# Patient Record
Sex: MALE | Race: WHITE | URBAN - METROPOLITAN AREA
[De-identification: names, ages, dates, MRNs, and addresses within clinical notes are randomized per-mention and may not be internally consistent; named-entity substitution may affect disease eponyms.]

---

## 2018-11-09 ENCOUNTER — OFFICE VISIT (OUTPATIENT)
Dept: FAMILY MEDICINE CLINIC | Age: 78
End: 2018-11-09

## 2018-11-09 VITALS
RESPIRATION RATE: 12 BRPM | DIASTOLIC BLOOD PRESSURE: 78 MMHG | SYSTOLIC BLOOD PRESSURE: 145 MMHG | HEART RATE: 63 BPM | OXYGEN SATURATION: 96 % | TEMPERATURE: 98.3 F | WEIGHT: 213 LBS

## 2018-11-09 DIAGNOSIS — M25.561 ACUTE PAIN OF RIGHT KNEE: Primary | ICD-10-CM

## 2018-11-09 RX ORDER — AMLODIPINE BESYLATE 5 MG/1
5 TABLET ORAL DAILY
COMMUNITY
End: 2018-12-14 | Stop reason: SDUPTHER

## 2018-11-09 RX ORDER — EZETIMIBE 10 MG/1
10 TABLET ORAL DAILY
COMMUNITY

## 2018-11-09 RX ORDER — GLUCOSAM/CHONDRO/HERB 149/HYAL 750-100 MG
1 TABLET ORAL DAILY
COMMUNITY
End: 2019-09-19

## 2018-11-09 RX ORDER — DIPHENHYDRAMINE HCL 25 MG
25 TABLET ORAL
COMMUNITY

## 2018-11-09 RX ORDER — LISINOPRIL 10 MG/1
TABLET ORAL DAILY
COMMUNITY
End: 2019-02-13 | Stop reason: SDUPTHER

## 2018-11-09 NOTE — PROGRESS NOTES
Chief Complaint   Patient presents with    Knee Pain     RT KNEE PAIN  NO INJURY   STARTED ABOUT 2 WKS        Health Maintenance Due   Topic    DTaP/Tdap/Td series (1 - Tdap)    Shingrix Vaccine Age 50> (1 of 2)    GLAUCOMA SCREENING Q2Y     Pneumococcal 65+ Low/Medium Risk (1 of 2 - PCV13)    Influenza Age 5 to Adult     MEDICARE YEARLY EXAM        Wt Readings from Last 3 Encounters:   11/09/18 213 lb (96.6 kg)     Temp Readings from Last 3 Encounters:   11/09/18 98.3 °F (36.8 °C) (Oral)     BP Readings from Last 3 Encounters:   11/09/18 145/78     Pulse Readings from Last 3 Encounters:   11/09/18 63         Learning Assessment:  :     No flowsheet data found. Depression Screening:  :     PHQ over the last two weeks 11/9/2018   Little interest or pleasure in doing things Not at all   Feeling down, depressed, irritable, or hopeless Not at all   Total Score PHQ 2 0       Fall Risk Assessment:  :     Fall Risk Assessment, last 12 mths 11/9/2018   Able to walk? Yes   Fall in past 12 months? No       Abuse Screening:  :     No flowsheet data found. Coordination of Care Questionnaire:  :     1) Have you been to an emergency room, urgent care clinic since your last visit? NO  Hospitalized since your last visit? NO             2) Have you seen or consulted any other health care providers outside of [address] [place] [address] since your last visit? NO  (Include any pap smears or colon screenings in this section.)    3) Do you have an Advance Directive on file? NO    Patient is accompanied by self I have received verbal consent from Lázaro HargroveShageluk to discuss any/all medical information while they are present in the room.

## 2018-11-09 NOTE — PROGRESS NOTES
CC: right knee pain    HPI:    70yo M presents with right knee pain with swelling for 3wks. The pain is a dull 6-7/10 intensity and felt most while walking. He denies any recent injury but does have a hx of right patellar fracture 4-5yrs ago which was treated with a brace for several wks. He previously took aleve for it but his cardiologist advised against chronic use of NSAIDs so he switched to tylenol 1000mg BID. Patient also believes the knee may have felt warm but denies any erythema. He does report having a hx of arthritis in the knees. Denies any fever or chills, denies hx of gout. Review of Systems: (positive items in bold)    Constitutional: Fever,chills, night sweats, weight change  MSK: joint pain and swelling of R knee  Skin: rashes, erythema       Current Outpatient Medications:     ezetimibe (ZETIA) 10 mg tablet, Take  by mouth., Disp: , Rfl:     amLODIPine (NORVASC) 5 mg tablet, Take 5 mg by mouth daily. , Disp: , Rfl:     lisinopril (PRINIVIL, ZESTRIL) 10 mg tablet, Take  by mouth daily. , Disp: , Rfl:     diphenhydrAMINE (BENADRYL ALLERGY) 25 mg tablet, Take 25 mg by mouth every six (6) hours as needed for Sleep., Disp: , Rfl:     omega 3-DHA-EPA-fish oil 1,000 mg (120 mg-180 mg) capsule, Take 1 Cap by mouth daily. , Disp: , Rfl:     ALLERGIES- REVIEWED  No Known Allergies    PAST MEDICAL HISTORY - REVIEWED  Past Medical History:   Diagnosis Date    CAD (coronary artery disease)     [facility]     GERD (gastroesophageal reflux disease)     Heart murmur     HTN (hypertension)     Hyperlipidemia     Mild anemia     Pulmonary embolism ([facility])         SOCIAL HISTORY - REVIEWED   Social History     Socioeconomic History    Marital status:      Spouse name: Not on file    Number of children: Not on file    Years of education: Not on file    Highest education level: Not on file   Social Needs    Financial resource strain: Not on file    Food insecurity - worry: Not on file    Food insecurity - inability: Not on file    Transportation needs - medical: Not on file   [employer] needs - non-medical: Not on file   Occupational History    Not on file   Tobacco Use    Smoking status: Former Smoker    Smokeless tobacco: Never Used   Substance and Sexual Activity    Alcohol use: Yes     Frequency: Never     Comment: OCASSIONALLY    Drug use: No    Sexual activity: Yes   Other Topics Concern    Not on file   Social History Narrative    Not on file        FAMILY MEDICAL HISTORY - REVIEWED  Family History   Problem Relation Age of Onset    Stroke Mother     Heart Attack Mother     Stroke Father     Heart Attack Father     Coronary Artery Disease Brother          Physical Exam:     Visit Vitals  /78   Pulse 63   Temp 98.3 °F (36.8 °C) (Oral)   Resp 12   Wt 213 lb (96.6 kg)   SpO2 96%       General appearance: alert, oriented, NAD   HEENT: PERRLA, moist mucus membranes, no LAD  CV: RRR, S1/S2 heard, no m/r/g  Resp: CTAB, no w/r/r  Abdominal: soft, non-tender to palpation, +BS  MSK: mild swelling of anterior medial aspect of right knee, synovial thickening with right knee larger than the left.  There was no effusion and there was diffuse tenderness especially over the Anserine bursa. Skin: no visible rashes      Assessment/Plan:     1. Acute pain of right knee: 68yo M presents with 3wks of right knee pain with swelling. Patient lacks fever or chills and has no hx of gout. Pain likely secondary to arthritis with effusion but could also be due to bursitis. Patient counseled on conservative measures including rest, ice, elevation, and use of tylenol up to 3g daily for pain. Patient also advised to try \"[place] dream\" cream to aid with pain as well.        Patient discussed with Dr. Maranda Wilde MD  Family Medicine Resident

## 2018-11-09 NOTE — PATIENT INSTRUCTIONS
Rest, ice, and elevate the knee. Try [place] dream cream. Take tylenol extrae strength two tablets up to 3 times per day for the pain.

## 2018-11-10 NOTE — PROGRESS NOTES
I saw and evaluated the patient, performing the key elements of the service. I discussed the findings, assessment and plan with the resident and agree with the resident's findings and plan as documented in the resident's note. The right knee on inspection was larger than the left. There was synovial thickening without significant effusion. There was no instability. He had diffuse medial knee discomfort especially over the Anserine bursa.

## 2018-11-29 ENCOUNTER — OFFICE VISIT (OUTPATIENT)
Dept: FAMILY MEDICINE CLINIC | Age: 78
End: 2018-11-29

## 2018-11-29 VITALS
SYSTOLIC BLOOD PRESSURE: 142 MMHG | OXYGEN SATURATION: 98 % | RESPIRATION RATE: 16 BRPM | BODY MASS INDEX: 28.85 KG/M2 | HEIGHT: 72 IN | WEIGHT: 213 LBS | HEART RATE: 79 BPM | DIASTOLIC BLOOD PRESSURE: 75 MMHG | TEMPERATURE: 97.8 F

## 2018-11-29 DIAGNOSIS — M25.561 ACUTE PAIN OF RIGHT KNEE: Primary | ICD-10-CM

## 2018-11-29 RX ORDER — METHYLPREDNISOLONE 4 MG/1
TABLET ORAL
Qty: 1 DOSE PACK | Refills: 0 | Status: SHIPPED | OUTPATIENT
Start: 2018-11-29 | End: 2019-09-19

## 2018-11-29 NOTE — PATIENT INSTRUCTIONS
Take steroid dose pack as instructed on packaging. Continue to use tylenol and apply ice 15minutes on and 15minutes off.

## 2018-11-29 NOTE — PROGRESS NOTES
CC: follow up on knee pain    HPI:    Patient presents today for follow up on right knee pain. He was seen on 11/11 for medial right knee pain and thought to have pes anserine bursitis. He was told to try conservative measures including ice, tylenol, and over the counter arthritis creams (Blue Emu Cream). Patient reports knee pain has persisted despite these measures. He states that the medial knee has now become a little erythematous as well. He denies any fevers or chills. He has no hx of gout. He does have some swelling at the medial knee. Review of Systems: (positive items in bold)    Constitutional: Fever,chills, night sweats, weight change  CV:  CP, palpitations, dizziness, syncope   Resp:  SOB, Cough, Wheezing  GI:  abdominal pain, n/v/d/c       Current Outpatient Medications:     apixaban (ELIQUIS) 5 mg tablet, Take 5 mg by mouth two (2) times a day., Disp: , Rfl:     ezetimibe (ZETIA) 10 mg tablet, Take  by mouth., Disp: , Rfl:     amLODIPine (NORVASC) 5 mg tablet, Take 5 mg by mouth daily. , Disp: , Rfl:     lisinopril (PRINIVIL, ZESTRIL) 10 mg tablet, Take  by mouth daily. , Disp: , Rfl:     diphenhydrAMINE (BENADRYL ALLERGY) 25 mg tablet, Take 25 mg by mouth every six (6) hours as needed for Sleep., Disp: , Rfl:     omega 3-DHA-EPA-fish oil 1,000 mg (120 mg-180 mg) capsule, Take 1 Cap by mouth daily. , Disp: , Rfl:     ALLERGIES- REVIEWED  Allergies   Allergen Reactions    Lipitor [Atorvastatin] Myalgia       PAST MEDICAL HISTORY - REVIEWED  Past Medical History:   Diagnosis Date    CAD (coronary artery disease)     [facility]     GERD (gastroesophageal reflux disease)     Heart murmur     HTN (hypertension)     Hyperlipidemia     Mild anemia     Pulmonary embolism ([facility])         SOCIAL HISTORY - REVIEWED   Social History     Socioeconomic History    Marital status:      Spouse name: Not on file    Number of children: Not on file    Years of education: Not on file    Highest education level: Not on file   Social Needs    Financial resource strain: Not on file    Food insecurity - worry: Not on file    Food insecurity - inability: Not on file    Transportation needs - medical: Not on file   [employer] needs - non-medical: Not on file   Occupational History    Not on file   Tobacco Use    Smoking status: Former Smoker    Smokeless tobacco: Never Used   Substance and Sexual Activity    Alcohol use: Yes     Frequency: Never     Comment: OCASSIONALLY    Drug use: No    Sexual activity: Yes   Other Topics Concern    Not on file   Social History Narrative    Not on file        FAMILY MEDICAL HISTORY - REVIEWED  Family History   Problem Relation Age of Onset    Stroke Mother     Heart Attack Mother     Stroke Father     Heart Attack Father     Coronary Artery Disease Brother          Physical Exam:     Visit Vitals  /75   Pulse 79   Temp 97.8 °F (36.6 °C) (Oral)   Resp 16   Ht 6' (1.829 m)   Wt 213 lb (96.6 kg)   SpO2 98%   BMI 28.89 kg/m²       General appearance: alert, oriented, NAD   MSK: TTP of medial joint at sight of pes anserine bursa, Full ROM of knee BL  Skin: mild erythema noted along medial knee inferior to joint line       Assessment/Plan:     1. Acute pain of right knee: Patient with likely pes anserine bursitis. Will treat with medrol dose pack and continue conservative measures, including rest, ice, elevation of knee and tylenol as needed for pain.         Patient discussed with Dr. Brendan Matson MD  [facility] Medicine Resident

## 2018-11-29 NOTE — PROGRESS NOTES
Chief Complaint   Patient presents with    Knee Pain     RT KNEE FOLLOW UP       LAST VISIT ON KNEE PAIN ABT 2 WKS AGO  FEELS LIKE A ROCK UNDER KNEE CAP        Health Maintenance Due   Topic    DTaP/Tdap/Td series (1 - Tdap)    Shingrix Vaccine Age 50> (1 of 2)    GLAUCOMA SCREENING Q2Y     Pneumococcal 65+ Low/Medium Risk (1 of 2 - PCV13)    Influenza Age 5 to Adult     MEDICARE YEARLY EXAM        Wt Readings from Last 3 Encounters:   11/29/18 213 lb (96.6 kg)   11/09/18 213 lb (96.6 kg)     Temp Readings from Last 3 Encounters:   11/29/18 97.8 °F (36.6 °C) (Oral)   11/09/18 98.3 °F (36.8 °C) (Oral)     BP Readings from Last 3 Encounters:   11/29/18 142/75   11/09/18 145/78     Pulse Readings from Last 3 Encounters:   11/29/18 79   11/09/18 63         Learning Assessment:  :     No flowsheet data found. Depression Screening:  :     PHQ over the last two weeks 11/29/2018   Little interest or pleasure in doing things Not at all   Feeling down, depressed, irritable, or hopeless Not at all   Total Score PHQ 2 0       Fall Risk Assessment:  :     Fall Risk Assessment, last 12 mths 11/29/2018   Able to walk? Yes   Fall in past 12 months? No       Abuse Screening:  :     No flowsheet data found. Coordination of Care Questionnaire:  :     1) Have you been to an emergency room, urgent care clinic since your last visit? NO  Hospitalized since your last visit? NO             2) Have you seen or consulted any other health care providers outside of [address] [place] [address] since your last visit? NO    (Include any pap smears or colon screenings in this section.)  NO    3) Do you have an Advance Directive on file? NO    Patient is accompanied by self I have received verbal consent from Dasha Jain to discuss any/all medical information while they are present in the room.

## 2018-11-30 NOTE — PROGRESS NOTES
I have reviewed the notes, assessments, and/or procedures performed, I concur with the residents documentation of [place].

## 2018-12-14 RX ORDER — AMLODIPINE BESYLATE 5 MG/1
5 TABLET ORAL DAILY
Qty: 90 TAB | Refills: 1 | Status: SHIPPED | OUTPATIENT
Start: 2018-12-14 | End: 2019-07-23 | Stop reason: SDUPTHER

## 2019-02-14 RX ORDER — LISINOPRIL 10 MG/1
10 TABLET ORAL DAILY
Qty: 90 TAB | Refills: 1 | Status: SHIPPED | OUTPATIENT
Start: 2019-02-14 | End: 2019-09-11 | Stop reason: SDUPTHER

## 2019-07-23 RX ORDER — AMLODIPINE BESYLATE 5 MG/1
5 TABLET ORAL DAILY
Qty: 90 TAB | Refills: 1 | Status: SHIPPED | OUTPATIENT
Start: 2019-07-23 | End: 2019-09-19 | Stop reason: SDUPTHER

## 2019-09-19 ENCOUNTER — OFFICE VISIT (OUTPATIENT)
Dept: FAMILY MEDICINE CLINIC | Age: 79
End: 2019-09-19

## 2019-09-19 VITALS
HEART RATE: 64 BPM | WEIGHT: 210 LBS | SYSTOLIC BLOOD PRESSURE: 138 MMHG | RESPIRATION RATE: 18 BRPM | BODY MASS INDEX: 28.44 KG/M2 | TEMPERATURE: 97.7 F | OXYGEN SATURATION: 96 % | DIASTOLIC BLOOD PRESSURE: 75 MMHG | HEIGHT: 72 IN

## 2019-09-19 DIAGNOSIS — I82.431 DEEP VEIN THROMBOSIS (DVT) OF POPLITEAL VEIN OF RIGHT LOWER EXTREMITY, UNSPECIFIED CHRONICITY (HCC): ICD-10-CM

## 2019-09-19 DIAGNOSIS — I10 ESSENTIAL HYPERTENSION: Primary | ICD-10-CM

## 2019-09-19 DIAGNOSIS — I26.92 SADDLE EMBOLUS OF PULMONARY ARTERY WITHOUT ACUTE COR PULMONALE, UNSPECIFIED CHRONICITY (HCC): ICD-10-CM

## 2019-09-19 DIAGNOSIS — E78.2 MIXED HYPERLIPIDEMIA: ICD-10-CM

## 2019-09-19 DIAGNOSIS — Z71.89 ADVANCED DIRECTIVES, COUNSELING/DISCUSSION: ICD-10-CM

## 2019-09-19 DIAGNOSIS — Z13.39 SCREENING FOR ALCOHOLISM: ICD-10-CM

## 2019-09-19 DIAGNOSIS — Z13.31 SCREENING FOR DEPRESSION: ICD-10-CM

## 2019-09-19 DIAGNOSIS — I25.810 CORONARY ARTERY DISEASE INVOLVING CORONARY BYPASS GRAFT OF NATIVE HEART WITHOUT ANGINA PECTORIS: ICD-10-CM

## 2019-09-19 DIAGNOSIS — Z23 ENCOUNTER FOR IMMUNIZATION: ICD-10-CM

## 2019-09-19 DIAGNOSIS — Z13.5 SCREENING FOR GLAUCOMA: ICD-10-CM

## 2019-09-19 DIAGNOSIS — Z00.00 MEDICARE ANNUAL WELLNESS VISIT, SUBSEQUENT: ICD-10-CM

## 2019-09-19 PROBLEM — R01.1 HEART MURMUR: Status: ACTIVE | Noted: 2019-09-19

## 2019-09-19 RX ORDER — AMLODIPINE BESYLATE 5 MG/1
5 TABLET ORAL DAILY
Qty: 90 TAB | Refills: 0 | Status: SHIPPED | OUTPATIENT
Start: 2019-09-19 | End: 2020-05-15

## 2019-09-19 RX ORDER — RANITIDINE 150 MG/1
150 TABLET, FILM COATED ORAL
COMMUNITY
End: 2020-11-18

## 2019-09-19 RX ORDER — LISINOPRIL 10 MG/1
10 TABLET ORAL DAILY
Qty: 90 TAB | Refills: 0 | Status: SHIPPED | OUTPATIENT
Start: 2019-09-19 | End: 2020-03-24

## 2019-09-19 RX ORDER — GUAIFENESIN 100 MG/5ML
81 LIQUID (ML) ORAL DAILY
COMMUNITY
End: 2021-05-17

## 2019-09-19 NOTE — ACP (ADVANCE CARE PLANNING)
Advance Care Planning    Advance Care Planning (ACP) Provider Conversation Snapshot    Date of ACP Conversation: 09/19/19  Persons included in Conversation:  patient  Length of ACP Conversation in minutes:  <16 minutes (Non-Billable)    Authorized Decision Maker (if patient is incapable of making informed decisions):    This person is:   Healthcare Agent/Medical Power of  under Advance Directive            For Patients with Decision Making Capacity:   Values/Goals: Exploration of values, goals, and preferences if recovery is not expected, even with continued medical treatment in the event of:  Imminent death  Severe, permanent brain injury    Conversation Outcomes / Follow-Up Plan:   Recommended completion of Advance Directive form after review of ACP materials and conversation with prospective healthcare agent   Recommended communicating the plan and making copies for the healthcare agent, personal physician, and others as appropriate (e.g., health system)

## 2019-09-19 NOTE — PROGRESS NOTES
1. Have you been to the ER, urgent care clinic since your last visit? Hospitalized since your last visit? No    2. Have you seen or consulted any other health care providers outside of the [address] [place] [address] since your last visit? Include any pap smears or colon screening.  No

## 2019-09-19 NOTE — PROGRESS NOTES
Rafael Moon  78 y.o. male  1940  [id number]    [facility] MEDICINE  Progress Note     Encounter Date: 9/19/2019    Assessment and Plan:     Encounter Diagnoses     ICD-10-CM ICD-9-CM   1. Essential hypertension I10 401.9   2. Mixed hyperlipidemia E78.2 272.2   3. Coronary artery disease involving coronary bypass graft of native heart without angina pectoris I25.810 414.05   4. Saddle embolus of pulmonary artery without acute cor pulmonale, unspecified chronicity (HCC) I26.92 415.13   5. Deep vein thrombosis (DVT) of popliteal vein of right lower extremity, unspecified chronicity (HCC) I82.431 453.41   6. Encounter for immunization Z23 V03.89   7. Screening for glaucoma Z13.5 V80.1   8. Advanced directives, counseling/discussion Z71.89 V65.49   9. Medicare annual wellness visit, subsequent Z00.00 V70.0   10. Screening for alcoholism Z13.39 V79.1   11. Screening for depression Z13.31 V79.0       1. Essential hypertension  Continue lisinopril and amlodipine. Recheck blood work. - TSH 3RD GENERATION  - METABOLIC PANEL, COMPREHENSIVE    2. Mixed hyperlipidemia  3. Coronary artery disease involving coronary bypass graft of native heart without angina pectoris  Followed by Dr. Justina Albert. On Zetia. Lipid lowering therapy not prescibed for patient reasons.  - LIPID PANEL    4. Saddle embolus of pulmonary artery without acute cor pulmonale, unspecified chronicity ([address]  5. Deep vein thrombosis (DVT) of popliteal vein of right lower extremity, unspecified chronicity (HCC)  On Eliquis lifelong. Followed by Dr. Esau Branch. 6. Encounter for immunization  - INFLUENZA VACCINE INACTIVATED (IIV), SUBUNIT, ADJUVANTED, IM  - ADMIN INFLUENZA VIRUS VAC  - varicella-zoster recombinant, PF, (SHINGRIX, PF,) 50 mcg/0.5 mL susr injection; 0.5 mL by IntraMuscular for 2 doses at least 2 months apart. Please fax confirmation to the attn of prescribing provider at above fax number.   Indications: Prevention of Shingles  Dispense: 0.5 mL; Refill: 1  - pneumococcal 23-mae ps vaccine (PNEUMOVAX 23) 25 mcg/0.5 mL injection; 0.5 mL by IntraMUSCular route once for 1 dose. Please fax confirmation to the attn of prescribing provider at [phone]. Indications: prevention of Streptococcus pneumoniae infection  Dispense: 0.5 mL; Refill: 0  - diphtheria-pertussis, acellular,-tetanus (BOOSTRIX TDAP) 2.5-8-5 Lf-mcg-Lf/0.5mL susp susp; 0.5 mL by IntraMUSCular route once for 1 dose. Please fax confirmation to the attn of prescribing provider at fax number listed above. Indications: Treatment to Prevent Diphtheria, Pertussis and Tetanus  Dispense: 0.5 mL; Refill: 0    7. Screening for glaucoma  - REFERRAL TO OPTOMETRY      I have discussed the diagnosis with the patient and the intended plan as seen in the above orders. he has expressed understanding. The patient has received an after-visit summary and questions were answered concerning future plans. I have discussed medication side effects and warnings with the patient as well. Electronically Signed: Pelon Mcpherson MD    Current Medications after this visit     Current Outpatient Medications   Medication Sig    raNITIdine (ZANTAC) 150 mg tablet Take 150 mg by mouth.  aspirin 81 mg chewable tablet Take 81 mg by mouth daily.  varicella-zoster recombinant, PF, (SHINGRIX, PF,) 50 mcg/0.5 mL susr injection 0.5 mL by IntraMuscular for 2 doses at least 2 months apart. Please fax confirmation to the attn of prescribing provider at above fax number. Indications: Prevention of Shingles    pneumococcal 23-mae ps vaccine (PNEUMOVAX 23) 25 mcg/0.5 mL injection 0.5 mL by IntraMUSCular route once for 1 dose. Please fax confirmation to the attn of prescribing provider at [phone].   Indications: prevention of Streptococcus pneumoniae infection    diphtheria-pertussis, acellular,-tetanus (BOOSTRIX TDAP) 2.5-8-5 Lf-mcg-Lf/0.5mL susp susp 0.5 mL by IntraMUSCular route once for 1 dose. Please fax confirmation to the attn of prescribing provider at fax number listed above. Indications: Treatment to Prevent Diphtheria, Pertussis and Tetanus    lisinopril (PRINIVIL, ZESTRIL) 10 mg tablet Take 1 Tab by mouth daily.  amLODIPine (NORVASC) 5 mg tablet Take 1 Tab by mouth daily.  apixaban (ELIQUIS) 5 mg tablet Take 5 mg by mouth two (2) times a day.  ezetimibe (ZETIA) 10 mg tablet Take 10 mg by mouth daily.  diphenhydrAMINE (BENADRYL ALLERGY) 25 mg tablet Take 25 mg by mouth every six (6) hours as needed for Sleep. No current facility-administered medications for this visit. Medications Discontinued During This Encounter   Medication Reason    methylPREDNISolone (MEDROL DOSEPACK) 4 mg tablet Not A Current Medication    omega 3-DHA-EPA-fish oil 1,000 mg (120 mg-180 mg) capsule Not A Current Medication    lisinopril (PRINIVIL, ZESTRIL) 10 mg tablet Reorder    amLODIPine (NORVASC) 5 mg tablet Reorder     ~~~~~~~~~~~~~~~~~~~~~~~~~~~~~~~~~~~~~~~~~~~~~~    Chief Complaint   Patient presents with   [address] [place] [address] Annual Wellness Visit         Immunization/Injection     patient would like to get the flu vaccine       History provided by patient  History of Present Illness   Jorge Baker is a 78 y.o. male who presents to clinic today for:    Hypertension: Are appropriate based on today's review and evaluation   BP Readings from Last 3 Encounters:   09/19/19 138/75   11/29/18 142/75   11/09/18 145/78     The patient reports:  taking medications as instructed, no medication side effects noted, no TIA's, no chest pain on exertion, no dyspnea on exertion, no swelling of ankles. Home monitoring:No      Hyperlipidemia:  Controlled  Cardiovascular risks for him are: existing CAD  hypertension  hyperlipidemia.    Currently he takes zetia, followed by Dr. Salima Reynoso: Yes: Comment: lipitor        Health Maintenance  VIIS queried and reviewed; updated in chart as appropriate.,  recommendation discussed and ordered with patient's permission. Health Maintenance Due   Topic Date Due    DTaP/Tdap/Td series (1 - Tdap) 04/19/1961    Shingrix Vaccine Age 50> (1 of 2) 04/19/1990    GLAUCOMA SCREENING Q2Y  04/19/2005    Pneumococcal 65+ years (2 of 2 - PPSV23) 07/19/2018    MEDICARE YEARLY EXAM  11/09/2018    Influenza Age 9 to Adult  08/01/2019     Review of Systems   Review of Systems   Constitutional:        See HPI for pertinent review of systems        Vitals/Objective:     Vitals:    09/19/19 0810   BP: 138/75   Pulse: 64   Resp: 18   Temp: 97.7 °F (36.5 °C)   TempSrc: Oral   SpO2: 96%   Weight: 210 lb (95.3 kg)   Height: 6' (1.829 m)     Body mass index is 28.48 kg/m². Wt Readings from Last 3 Encounters:   09/19/19 210 lb (95.3 kg)   11/29/18 213 lb (96.6 kg)   11/09/18 213 lb (96.6 kg)       Physical Exam   Constitutional: He is oriented to person, place, and time. He appears well-developed and well-nourished. No distress. HENT:   Head: Normocephalic and atraumatic. Right Ear: External ear normal.   Left Ear: External ear normal.   Mouth/Throat: Oropharynx is clear and moist. No oropharyngeal exudate. Cardiovascular: Normal rate, S1 normal and S2 normal.   Murmur heard. Systolic murmur is present with a grade of 2/6. Pulmonary/Chest: Effort normal and breath sounds normal. He has no rales. Musculoskeletal: He exhibits no edema. Neurological: He is alert and oriented to person, place, and time. Skin: Skin is warm and dry. No results found for this or any previous visit (from the past 24 hour(s)). This is the Subsequent Medicare Annual Wellness Exam, performed 12 months or more after the Initial AWV or the last Subsequent AWV    I have reviewed the patient's medical history in detail and updated the computerized patient record.    History     Past Medical History:   Diagnosis Date    CAD (coronary artery disease)     DVT (deep venous thrombosis) ([facility] [address]     GERD (gastroesophageal reflux disease)     Heart murmur     HTN (hypertension)     Hyperlipidemia     Mild anemia     Pulmonary embolism (HCC)       Past Surgical History:   Procedure Laterality Date    HX CORONARY ARTERY BYPASS GRAFT      07/24/2007 by Dr. Grayson Fox at [facility]    Lipitor [Atorvastatin] Myalgia     Family History   Problem Relation Age of Onset    Stroke Mother     Heart Attack Mother     Stroke Father     Heart Attack Father     Coronary Artery Disease Brother     Deep Vein Thrombosis Brother     Deep Vein Thrombosis Brother     Coronary Artery Disease Brother     Deep Vein Thrombosis Brother     Cancer Brother         Throat cancer    Coronary Artery Disease Brother      Social History     Tobacco Use    Smoking status: Former Smoker    Smokeless tobacco: Never Used   Substance Use Topics    Alcohol use: Yes     Frequency: Never     Comment: OCASSIONALLY     Depression Risk Factor Screening:     3 most recent PHQ Screens 9/19/2019   Little interest or pleasure in doing things Not at all   Feeling down, depressed, irritable, or hopeless Not at all   Total Score PHQ 2 0     Alcohol Risk Factor Screening:     AUDIT-C 9/19/2019   How often do you have a drink containing alcohol? 1   How many standard drinks containing alcohol do you have on a typical day? 0   How often do you have six or more drinks on one occasion? 0   Audit-C Score 1     Functional Ability and Level of Safety:   Hearing Loss  The patient needs further evaluation.     Activities of Daily Living  ADL Assessment 9/19/2019   Feeding yourself No Help Needed   Getting from bed to chair No Help Needed   Getting dressed No Help Needed   Bathing or showering No Help Needed   Walk across the room (includes cane/walker) No Help Needed   Using the telphone No Help Needed   Taking your medications No Help Needed   Preparing meals No Help Needed   Managing money (expenses/bills) No Help Needed   Moderately strenuous housework (laundry) No Help Needed   Shopping for personal items (toiletries/medicines) No Help Needed   Shopping for groceries No Help Needed   Driving No Help Needed   Climbing a flight of stairs No Help Needed   Getting to places beyond walking distances No Help Needed       Fall Risk  Fall Risk Assessment, last 12 mths 9/19/2019   Able to walk? Yes   Fall in past 12 months? No       Abuse Screen  Abuse Screening Questionnaire 9/19/2019   Do you ever feel afraid of your partner? N   Are you in a relationship with someone who physically or mentally threatens you? N   Is it safe for you to go home? Y     Cognitive Screening   Evaluation of Cognitive Function:  Has your family/caregiver stated any concerns about your memory: no    No flowsheet data found. Patient Care Team   Patient Care Team:  Modena Crigler, MD as PCP - [facility])  Kevan Turner DO (Pulmonary Disease)  Nena Shine MD (Cardiology)  Assessment/Plan   Education and counseling provided:  Are appropriate based on today's review and evaluation  End-of-Life planning (with patient's consent)  Pneumococcal Vaccine  Influenza Vaccine  Screening for glaucoma    Diagnoses and all orders for this visit:    1. Essential hypertension  -     TSH 3RD GENERATION  -     METABOLIC PANEL, COMPREHENSIVE  -     lisinopril (PRINIVIL, ZESTRIL) 10 mg tablet; Take 1 Tab by mouth daily. -     amLODIPine (NORVASC) 5 mg tablet; Take 1 Tab by mouth daily. 2. Mixed hyperlipidemia  -     LIPID PANEL    3. Coronary artery disease involving coronary bypass graft of native heart without angina pectoris  -     LIPID PANEL    4. Saddle embolus of pulmonary artery without acute cor pulmonale, unspecified chronicity (HCC)    5. Deep vein thrombosis (DVT) of popliteal vein of right lower extremity, unspecified chronicity (HCC)    6.  Encounter for immunization  -     INFLUENZA VACCINE INACTIVATED (IIV), SUBUNIT, ADJUVANTED, IM  -     ADMIN INFLUENZA VIRUS VAC  -     varicella-zoster recombinant, PF, (SHINGRIX, PF,) 50 mcg/0.5 mL susr injection; 0.5 mL by IntraMuscular for 2 doses at least 2 months apart. Please fax confirmation to the attn of prescribing provider at above fax number. Indications: Prevention of Shingles  -     pneumococcal 23-mae ps vaccine (PNEUMOVAX 23) 25 mcg/0.5 mL injection; 0.5 mL by IntraMUSCular route once for 1 dose. Please fax confirmation to the attn of prescribing provider at [phone]. Indications: prevention of Streptococcus pneumoniae infection  -     diphtheria-pertussis, acellular,-tetanus (BOOSTRIX TDAP) 2.5-8-5 Lf-mcg-Lf/0.5mL susp susp; 0.5 mL by IntraMUSCular route once for 1 dose. Please fax confirmation to the attn of prescribing provider at fax number listed above. Indications: Treatment to Prevent Diphtheria, Pertussis and Tetanus    7. Screening for glaucoma  -     REFERRAL TO OPTOMETRY    8. Advanced directives, counseling/discussion    9. Medicare annual wellness visit, subsequent    10. Screening for alcoholism  -     [place] ANNUAL ALCOHOL SCREEN 15 MIN    11. Screening for depression  -     [facility] Maintenance Topics with due status: Overdue       Topic Date Due    DTaP/Tdap/Td series 04/19/1961    Shingrix Vaccine Age 50> 04/19/1990    GLAUCOMA SCREENING Q2Y 04/19/2005    Pneumococcal 65+ years 07/19/2018    MEDICARE YEARLY EXAM 11/09/2018    Influenza Age 9 to Adult 08/01/2019        Disposition     Follow-up and Dispositions  ·   Return in about 6 weeks (around 10/31/2019) for Routine (Chronic Conditions). No future appointments. History   Patient's past medical, surgical and family histories were reviewed and updated.     Past Medical History:   Diagnosis Date    CAD (coronary artery disease)     DVT (deep venous thrombosis) (HCC)     GERD (gastroesophageal reflux disease)     Heart murmur     HTN (hypertension)     Hyperlipidemia     Mild anemia     Pulmonary embolism (HCC)      Past Surgical History:   Procedure Laterality Date    HX CORONARY ARTERY BYPASS GRAFT      07/24/2007 by Dr. Carmel Don at [facility]     Family History   Problem Relation Age of Onset    Stroke Mother     Heart Attack Mother     Stroke Father     Heart Attack Father     Coronary Artery Disease Brother     Deep Vein Thrombosis Brother     Deep Vein Thrombosis Brother     Coronary Artery Disease Brother     Deep Vein Thrombosis Brother     Cancer Brother         Throat cancer    Coronary Artery Disease Brother      Social History     Tobacco Use    Smoking status: Former Smoker    Smokeless tobacco: Never Used   Substance Use Topics    Alcohol use: Yes     Frequency: Never     Comment: OCASSIONALLY    Drug use: No       Allergies     Allergies   Allergen Reactions    Lipitor [Atorvastatin] Myalgia

## 2019-09-19 NOTE — PATIENT INSTRUCTIONS
Vaccine Information Statement Influenza (Flu) Vaccine (Inactivated or Recombinant): What You Need to Know Many Vaccine Information Statements are available in Georgian and other languages. See www.immunize.org/vis Hojas de información sobre vacunas están disponibles en español y en muchos otros idiomas. Visite www.immunize.org/vis 1. Why get vaccinated? Influenza vaccine can prevent influenza (flu). Flu is a contagious disease that spreads around the [facility] every year, usually between October and May. Anyone can get the flu, but it is more dangerous for some people. Infants and young children, people 72years of age and older, pregnant women, and people with certain health conditions or a weakened immune system are at greatest risk of flu complications. Pneumonia, bronchitis, sinus infections and ear infections are examples of flu-related complications. If you have a medical condition, such as heart disease, cancer or diabetes, flu can make it worse. Flu can cause fever and chills, sore throat, muscle aches, fatigue, cough, headache, and runny or stuffy nose. Some people may have vomiting and diarrhea, though this is more common in children than adults. Each year thousands of people in the [facility] die from flu, and many more are hospitalized. Flu vaccine prevents millions of illnesses and flu-related visits to the doctor each year. 2. Influenza vaccines CDC recommends everyone 10months of age and older get vaccinated every flu season. Children 6 months through 6years of age may need 2 doses during a single flu season. Everyone else needs only 1 dose each flu season. It takes about 2 weeks for protection to develop after vaccination. There are many flu viruses, and they are always changing. Each year a new flu vaccine is made to protect against three or four viruses that are likely to cause disease in the upcoming flu season.  Even when the vaccine doesnt exactly match these viruses, it may still provide some protection. Influenza vaccine does not cause flu. Influenza vaccine may be given at the same time as other vaccines. 3. Talk with your health care provider Tell your vaccine provider if the person getting the vaccine: 
 Has had an allergic reaction after a previous dose of influenza vaccine, or has any severe, life-threatening allergies.  Has ever had Guillain-Barré Syndrome (also called GBS). In some cases, your health care provider may decide to postpone influenza vaccination to a future visit. People with minor illnesses, such as a cold, may be vaccinated. People who are moderately or severely ill should usually wait until they recover before getting influenza vaccine. Your health care provider can give you more information. 4. Risks of a reaction  Soreness, redness, and swelling where shot is given, fever, muscle aches, and headache can happen after influenza vaccine.  There may be a very small increased risk of Guillain-Barré Syndrome (GBS) after inactivated influenza vaccine (the flu shot). Yue [place]ott children who get the flu shot along with pneumococcal vaccine (PCV13), and/or DTaP vaccine at the same time might be slightly more likely to have a seizure caused by fever. Tell your health care provider if a child who is getting flu vaccine has ever had a seizure. People sometimes faint after medical procedures, including vaccination. Tell your provider if you feel dizzy or have vision changes or ringing in the ears. As with any medicine, there is a very remote chance of a vaccine causing a severe allergic reaction, other serious injury, or death. 5. What if there is a serious problem? An allergic reaction could occur after the vaccinated person leaves the clinic.  If you see signs of a severe allergic reaction (hives, swelling of the face and throat, difficulty breathing, a fast heartbeat, dizziness, or weakness), call 9-1-1 and get the person to the nearest hospital. 
 
For other signs that concern you, call your health care provider. Adverse reactions should be reported to the Vaccine Adverse Event Reporting System (VAERS). Your health care provider will usually file this report, or you can do it yourself. Visit the VAERS website at www.vaers. hhs.gov or call 7-771.374.7315. VAERS is only for reporting reactions, and VAERS staff do not give medical advice. 6. The National Vaccine Injury Compensation Program 
 
The [facility] Vaccine Injury Compensation Program (VICP) is a federal program that was created to compensate people who may have been injured by certain vaccines. Visit the VICP website at www.hrsa.gov/vaccinecompensation or call 1-954.293.2397 to learn about the program and about filing a claim. There is a time limit to file a claim for compensation. 7. How can I learn more?  Ask your health care provider.  Call your local or state health department.  Contact the Centers for Disease Control and Prevention (CDC): 
- Call 7-197.391.7006 (1-800-CDC-INFO) or 
- Visit CDCs influenza website at www.cdc.gov/flu Vaccine Information Statement (Interim) Inactivated Influenza Vaccine 8/15/2019 
42 DAYANNA Castrejon [id number] Department of [facility] and [employer] Centers for Disease Control and Prevention Office Use Only

## 2019-09-24 LAB
ALBUMIN SERPL-MCNC: 4.3 G/DL (ref 3.5–4.8)
ALBUMIN/GLOB SERPL: 1.9 {RATIO} (ref 1.2–2.2)
ALP SERPL-CCNC: 45 IU/L (ref 39–117)
ALT SERPL-CCNC: 54 IU/L (ref 0–44)
AST SERPL-CCNC: 42 IU/L (ref 0–40)
BILIRUB SERPL-MCNC: 0.3 MG/DL (ref 0–1.2)
BUN SERPL-MCNC: 15 MG/DL (ref 8–27)
BUN/CREAT SERPL: 15 (ref 10–24)
CALCIUM SERPL-MCNC: 9.3 MG/DL (ref 8.6–10.2)
CHLORIDE SERPL-SCNC: 102 MMOL/L (ref 96–106)
CHOLEST SERPL-MCNC: 210 MG/DL (ref 100–199)
CO2 SERPL-SCNC: 20 MMOL/L (ref 20–29)
CREAT SERPL-MCNC: 1.01 MG/DL (ref 0.76–1.27)
GLOBULIN SER CALC-MCNC: 2.3 G/DL (ref 1.5–4.5)
GLUCOSE SERPL-MCNC: 121 MG/DL (ref 65–99)
HDLC SERPL-MCNC: 58 MG/DL
LDLC SERPL CALC-MCNC: 99 MG/DL (ref 0–99)
POTASSIUM SERPL-SCNC: 4.2 MMOL/L (ref 3.5–5.2)
PROT SERPL-MCNC: 6.6 G/DL (ref 6–8.5)
SODIUM SERPL-SCNC: 140 MMOL/L (ref 134–144)
TRIGL SERPL-MCNC: 266 MG/DL (ref 0–149)
TSH SERPL DL<=0.005 MIU/L-ACNC: 3.7 UIU/ML (ref 0.45–4.5)
VLDLC SERPL CALC-MCNC: 53 MG/DL (ref 5–40)

## 2019-10-16 RX ORDER — EZETIMIBE 10 MG/1
10 TABLET ORAL DAILY
OUTPATIENT
Start: 2019-10-16

## 2019-10-16 NOTE — TELEPHONE ENCOUNTER
According to the patient at his last appointment, zetia is being prescribed by his cardiologist, Dr. Stephanie Doss.      Sae Lehman MD

## 2019-10-31 ENCOUNTER — OFFICE VISIT (OUTPATIENT)
Dept: FAMILY MEDICINE CLINIC | Age: 79
End: 2019-10-31

## 2019-10-31 VITALS
HEART RATE: 64 BPM | BODY MASS INDEX: 29.93 KG/M2 | OXYGEN SATURATION: 98 % | RESPIRATION RATE: 16 BRPM | HEIGHT: 72 IN | SYSTOLIC BLOOD PRESSURE: 125 MMHG | WEIGHT: 221 LBS | DIASTOLIC BLOOD PRESSURE: 71 MMHG | TEMPERATURE: 97.8 F

## 2019-10-31 DIAGNOSIS — I10 ESSENTIAL HYPERTENSION: Primary | ICD-10-CM

## 2019-10-31 DIAGNOSIS — R73.01 IMPAIRED FASTING GLUCOSE: ICD-10-CM

## 2019-10-31 DIAGNOSIS — E78.2 MIXED HYPERLIPIDEMIA: ICD-10-CM

## 2019-10-31 DIAGNOSIS — E66.3 OVERWEIGHT (BMI 25.0-29.9): ICD-10-CM

## 2019-10-31 DIAGNOSIS — K76.0 FATTY LIVER: ICD-10-CM

## 2019-10-31 NOTE — PROGRESS NOTES
Paul Suh  78 y.o. male  1940  [id number]    [facility] MEDICINE  Progress Note     Encounter Date: 10/31/2019    Assessment and Plan:     Encounter Diagnoses     ICD-10-CM ICD-9-CM   1. Essential hypertension I10 401.9   2. Impaired fasting glucose R73.01 790.21   3. Fatty liver K76.0 571.8   4. Overweight (BMI 25.0-29. 9) E66.3 278.02   5. Mixed hyperlipidemia E78.2 272.2       1. Essential hypertension  Controlled. Continue current medications. 2. Impaired fasting glucose  3. Fatty liver  4. Overweight (BMI 25.0-29.9)  5. Mixed hyperlipidemia  Over 50% of the 25 minutes face to face with Paul Suh consisted of counseling and/or discussing treatment plans in reference to his diet. We also discussed increasing exercise gradually as patient had become sedentary following blood clot due to decreased exercise tolerance. I have discussed the diagnosis with the patient and the intended plan as seen in the above orders. he has expressed understanding. The patient has received an after-visit summary and questions were answered concerning future plans. I have discussed medication side effects and warnings with the patient as well. Electronically Signed: Ilia Anthony MD    Current Medications after this visit     Current Outpatient Medications   Medication Sig    raNITIdine (ZANTAC) 150 mg tablet Take 150 mg by mouth.  aspirin 81 mg chewable tablet Take 81 mg by mouth daily.  lisinopril (PRINIVIL, ZESTRIL) 10 mg tablet Take 1 Tab by mouth daily.  amLODIPine (NORVASC) 5 mg tablet Take 1 Tab by mouth daily.  apixaban (ELIQUIS) 5 mg tablet Take 5 mg by mouth two (2) times a day.  ezetimibe (ZETIA) 10 mg tablet Take 10 mg by mouth daily.  diphenhydrAMINE (BENADRYL ALLERGY) 25 mg tablet Take 25 mg by mouth every six (6) hours as needed for Sleep. No current facility-administered medications for this visit.       Medications Discontinued During This Encounter   Medication Reason    varicella-zoster recombinant, PF, (SHINGRIX, PF,) 50 mcg/0.5 mL susr injection Therapy Completed     ~~~~~~~~~~~~~~~~~~~~~~~~~~~~~~~~~~~~~~~~~~~~~~    Chief Complaint   Patient presents with    Labs     6 weeks follow up       History provided by patient  History of Present Illness   Radha Nunez is a 78 y.o. male who presents to clinic today for:    Hypertension: Controlled   BP Readings from Last 3 Encounters:   10/31/19 125/71   09/19/19 138/75   11/29/18 142/75     The patient reports:  taking medications as instructed, no medication side effects noted, no TIA's, no chest pain on exertion, no dyspnea on exertion, no swelling of ankles. Home monitoring:No      IFG and fatty liver Follow up: Stable   Overall the patient feels his dietary compliance is Fair   Diabetic Consultants:Endocrinologist - N/A   Last HbA1c: No results found for: HBA1C, HGBE8, WPW8JJQE, PAM1GSIS, TYH6VQEY   Therapy:diet   Medication compliance:Good   Frequency of home glucose testing: N/A   Blood Sugar range at home: N/A   Polyuria, polyphagia or polydipsia: NO   Low blood sugar symptoms: No       Health Maintenance  patient reports he had shingles and pneumoccoal vaccines yesterday. only shingrix crossed over in EMR. Will contact Alonzo Esparza. , Completed by outside provider. Release for records signed.,  recommendation discussed and ordered with patient's permission.   Health Maintenance Due   Topic Date Due    DTaP/Tdap/Td series (1 - Tdap) 04/19/1961    GLAUCOMA SCREENING Q2Y  04/19/2005    Pneumococcal 65+ years (2 of 2 - PPSV23) 07/19/2018     Review of Systems   Review of Systems   Constitutional:        See HPI for pertinent review of systems        Vitals/Objective:     Vitals:    10/31/19 0820   BP: 125/71   Pulse: 64   Resp: 16   Temp: 97.8 °F (36.6 °C)   TempSrc: Oral   SpO2: 98%   Weight: 221 lb (100.2 kg)   Height: 6' (1.829 m)     Body mass index is 29.97 kg/m². Wt Readings from Last 3 Encounters:   10/31/19 221 lb (100.2 kg)   09/19/19 210 lb (95.3 kg)   11/29/18 213 lb (96.6 kg)       Physical Exam   Constitutional: He is oriented to person, place, and time. He appears well-developed and well-nourished. No distress. HENT:   Head: Normocephalic and atraumatic. Right Ear: External ear normal.   Left Ear: External ear normal.   Mouth/Throat: Oropharynx is clear and moist. No oropharyngeal exudate. Cardiovascular: Normal rate, S1 normal and S2 normal.   Murmur heard. Systolic murmur is present with a grade of 2/6. Pulmonary/Chest: Effort normal and breath sounds normal. He has no rales. Musculoskeletal: He exhibits no edema. Neurological: He is alert and oriented to person, place, and time. Skin: Skin is warm and dry. No results found for this or any previous visit (from the past 24 hour(s)). Disposition     Follow-up and Dispositions  ·   Return in about 5 months (around 3/31/2020) for Routine (Chronic Conditions). .       No future appointments. History   Patient's past medical, surgical and family histories were reviewed and updated.     Past Medical History:   Diagnosis Date    CAD (coronary artery disease)     DVT (deep venous thrombosis) (HCC)     GERD (gastroesophageal reflux disease)     Heart murmur     HTN (hypertension)     Hyperlipidemia     Mild anemia     Pulmonary embolism (HCC)      Past Surgical History:   Procedure Laterality Date    HX CORONARY ARTERY BYPASS GRAFT      07/24/2007 by Dr. Emanuel Hunter at [facility]     Family History   Problem Relation Age of Onset    Stroke Mother     Heart Attack Mother     Stroke Father     Heart Attack Father     Coronary Artery Disease Brother     Deep Vein Thrombosis Brother     Deep Vein Thrombosis Brother     Coronary Artery Disease Brother     Deep Vein Thrombosis Brother     Cancer Brother         Throat cancer    Coronary Artery Disease Brother Social History     Tobacco Use    Smoking status: Former Smoker    Smokeless tobacco: Never Used   Substance Use Topics    Alcohol use: Yes     Frequency: Never     Comment: OCASSIONALLY    Drug use: No       Allergies     Allergies   Allergen Reactions    Lipitor [Atorvastatin] Myalgia

## 2019-10-31 NOTE — PATIENT INSTRUCTIONS
Learning About Low-Carbohydrate Diets for Weight Loss What is a low-carbohydrate diet? Low-carb diets avoid foods that are high in carbohydrate. These high-carb foods include pasta, bread, rice, cereal, fruits, and starchy vegetables. Instead, these diets usually have you eat foods that are high in fat and protein. Many people lose weight quickly on a low-carb diet. But the early weight loss is water. People on this diet often gain the weight back after they start eating carbs again. Not all diet plans are safe or work well. A lot of the evidence shows that low-carb diets aren't healthy. That's because these diets often don't include healthy foods like fruits and vegetables. Losing weight safely means balancing protein, fat, and carbs with every meal and snack. And low-carb diets don't always provide the vitamins, minerals, and fiber you need. If you have a serious medical condition, talk to your doctor before you try any diet. These conditions include kidney disease, heart disease, type 2 diabetes, high cholesterol, and high blood pressure. If you are pregnant, it may not be safe for your baby if you are on a low-carb diet. How can you lose weight safely? You might have heard that a diet plan helped another person lose weight. But that doesn't mean that it will work for you. It is very hard to stay on a diet that includes lots of big changes in your eating habits. If you want to get to a healthy weight and stay there, making healthy lifestyle changes will often work better than dieting. These steps can help. · Make a plan for change. Work with your doctor to create a plan that is right for you. · See a dietitian. He or she can show you how to make healthy changes in your eating habits. · Manage stress. If you have a lot of stress in your life, it can be hard to focus on making healthy changes to your daily habits. · Track your food and activity.  You are likely to do better at losing weight if you keep track of what you eat and what you do. Follow-up care is a key part of your treatment and safety. Be sure to make and go to all appointments, and call your doctor if you are having problems. It's also a good idea to know your test results and keep a list of the medicines you take. Where can you learn more? Go to http://sunny-kelton.info/. Enter A121 in the search box to learn more about \"Learning About Low-Carbohydrate Diets for Weight Loss. \" Current as of: November 7, 2018 Content Version: 12.2 © 8130-1619 [employer]. Care instructions adapted under license by [employer] (which disclaims liability or warranty for this information). If you have questions about a medical condition or this instruction, always ask your healthcare professional. [address] any warranty or liability for your use of this information.

## 2019-10-31 NOTE — PROGRESS NOTES
Patient stated name &     Chief Complaint   Patient presents with    Labs     6 weeks follow up        Health Maintenance Due   Topic    DTaP/Tdap/Td series (1 - Tdap)    Shingrix Vaccine Age 50> (1 of 2)    GLAUCOMA SCREENING Q2Y     Pneumococcal 65+ years (2 of 2 - PPSV23)       Wt Readings from Last 3 Encounters:   10/31/19 221 lb (100.2 kg)   19 210 lb (95.3 kg)   18 213 lb (96.6 kg)     Temp Readings from Last 3 Encounters:   10/31/19 97.8 °F (36.6 °C) (Oral)   19 97.7 °F (36.5 °C) (Oral)   18 97.8 °F (36.6 °C) (Oral)     BP Readings from Last 3 Encounters:   10/31/19 125/71   19 138/75   18 142/75     Pulse Readings from Last 3 Encounters:   10/31/19 64   19 64   18 79         Learning Assessment:  :     No flowsheet data found. Depression Screening:  :     3 most recent PHQ Screens 10/31/2019   Little interest or pleasure in doing things Not at all   Feeling down, depressed, irritable, or hopeless Not at all   Total Score PHQ 2 0       Fall Risk Assessment:  :     Fall Risk Assessment, last 12 mths 10/31/2019   Able to walk? Yes   Fall in past 12 months? No       Abuse Screening:  :     Abuse Screening Questionnaire 2019   Do you ever feel afraid of your partner? N   Are you in a relationship with someone who physically or mentally threatens you? N   Is it safe for you to go home? Y       Coordination of Care Questionnaire:  :     1) Have you been to an emergency room, urgent care clinic since your last visit? No    Hospitalized since your last visit? No             2) Have you seen or consulted any other health care providers outside of [address] [place] [address] since your last visit? No  (Include any pap smears or colon screenings in this section.)    Patient is accompanied by self I have received verbal consent from Barb Minaya to discuss any/all medical information while they are present in the room.

## 2019-11-07 PROBLEM — I35.0 NON-RHEUMATIC AORTIC STENOSIS: Status: ACTIVE | Noted: 2019-09-19

## 2020-05-15 ENCOUNTER — VIRTUAL VISIT (OUTPATIENT)
Dept: FAMILY MEDICINE CLINIC | Age: 80
End: 2020-05-15

## 2020-05-15 VITALS — TEMPERATURE: 96.8 F | SYSTOLIC BLOOD PRESSURE: 106 MMHG | DIASTOLIC BLOOD PRESSURE: 64 MMHG | HEART RATE: 81 BPM

## 2020-05-15 DIAGNOSIS — I10 ESSENTIAL HYPERTENSION: ICD-10-CM

## 2020-05-15 RX ORDER — AMLODIPINE BESYLATE 2.5 MG/1
2.5 TABLET ORAL DAILY
Qty: 90 TAB | Refills: 1 | Status: SHIPPED | OUTPATIENT
Start: 2020-05-15 | End: 2020-11-18 | Stop reason: SDUPTHER

## 2020-05-15 RX ORDER — LISINOPRIL 10 MG/1
10 TABLET ORAL DAILY
Qty: 90 TAB | Refills: 1 | Status: SHIPPED | OUTPATIENT
Start: 2020-05-15 | End: 2020-11-18 | Stop reason: SDUPTHER

## 2020-05-15 NOTE — PROGRESS NOTES
Patient stated name &     Chief Complaint   Patient presents with    Medication Refill     Amlodipine        Health Maintenance Due   Topic    DTaP/Tdap/Td series (1 - Tdap)    GLAUCOMA SCREENING Q2Y     Pneumococcal 65+ years (2 of 2 - PPSV23)    Shingrix Vaccine Age 50> (2 of 2)       Wt Readings from Last 3 Encounters:   10/31/19 221 lb (100.2 kg)   19 210 lb (95.3 kg)   18 213 lb (96.6 kg)     Temp Readings from Last 3 Encounters:   10/31/19 97.8 °F (36.6 °C) (Oral)   19 97.7 °F (36.5 °C) (Oral)   18 97.8 °F (36.6 °C) (Oral)     BP Readings from Last 3 Encounters:   10/31/19 125/71   19 138/75   18 142/75     Pulse Readings from Last 3 Encounters:   10/31/19 64   19 64   18 79         Learning Assessment:  :     No flowsheet data found. Depression Screening:  :     3 most recent PHQ Screens 10/31/2019   Little interest or pleasure in doing things Not at all   Feeling down, depressed, irritable, or hopeless Not at all   Total Score PHQ 2 0       Fall Risk Assessment:  :     Fall Risk Assessment, last 12 mths 10/31/2019   Able to walk? Yes   Fall in past 12 months? No       Abuse Screening:  :     Abuse Screening Questionnaire 2019   Do you ever feel afraid of your partner? N   Are you in a relationship with someone who physically or mentally threatens you? N   Is it safe for you to go home? Y       Coordination of Care Questionnaire:  :     1) Have you been to an emergency room, urgent care clinic since your last visit? No  Hospitalized since your last visit? No             2) Have you seen or consulted any other health care providers outside of [address] [place] [address] since your last visit? No  (Include any pap smears or colon screenings in this section.)    Patient is accompanied by VV I have received verbal consent from Prudy Headings to discuss any/all medical information while they are present in the room.

## 2020-05-15 NOTE — PROGRESS NOTES
Cesar Higgins  [de-identified] y.o. male  1940  [id number]    [facility] FAMILY MEDICINE  Progress Note     Encounter Date: 5/15/2020    Cesar Higgins is a [de-identified] y.o. male who was seen by synchronous (real-time) audio-video technology on 5/15/2020. He and/or him healthcare decision maker is aware that this patient-initiated Telehealth encounter is a billable service, with coverage as determined by her insurance carrier. He  is aware that he may receive a bill and has provided verbal consent to proceed: Yes    I was at home while conducting this encounter. The patient was checked in/\"roomed\" by Natacha Tee CMA via telephone in the office. The patient was at home during the encounter. This visit was completed virtually using Doxy. me  Assessment and Plan:     Encounter Diagnoses     ICD-10-CM ICD-9-CM   1. Essential hypertension I10 401.9       1. Essential hypertension  Home BP running low and with symptoms of orthostatic hypotension advised that dose of amlodipine will be reduced to 2.5 mg. Patient advised to monitor BP. If SBP <120 or >150, to contact office for further instructions. - amLODIPine (NORVASC) 2.5 mg tablet; Take 1 Tab by mouth daily. Dispense: 90 Tab; Refill: 1  - lisinopriL (PRINIVIL, ZESTRIL) 10 mg tablet; Take 1 Tab by mouth daily. Dispense: 90 Tab; Refill: 1      We discussed the expected course, resolution and complications of the diagnosis(es) in detail. Medication risks, benefits, costs, interactions, and alternatives were discussed as indicated. I advised him to contact the office if his condition worsens, changes or fails to improve as anticipated. He expressed understanding with the diagnosis(es) and plan.      CPT Codes 47545-92169 for Established Patients may apply to this Telehealth Visit      Pursuant to the emergency declaration under the [facility] [place], [address] waiver authority and the [employer] and [employer] Supplemental Appropriations Act, this Virtual  Visit was conducted, with patient's consent, to reduce the patient's risk of exposure to COVID-19 and provide continuity of care for an established patient. Services were provided through a video synchronous discussion virtually to substitute for in-person clinic visit. Electronically Signed: Tio Roberts MD    Current Medications after this visit     Current Outpatient Medications   Medication Sig    amLODIPine (NORVASC) 2.5 mg tablet Take 1 Tab by mouth daily.  lisinopriL (PRINIVIL, ZESTRIL) 10 mg tablet Take 1 Tab by mouth daily.  aspirin 81 mg chewable tablet Take 81 mg by mouth daily.  apixaban (ELIQUIS) 5 mg tablet Take 5 mg by mouth two (2) times a day.  ezetimibe (ZETIA) 10 mg tablet Take 10 mg by mouth daily.  diphenhydrAMINE (BENADRYL ALLERGY) 25 mg tablet Take 25 mg by mouth every six (6) hours as needed for Sleep.  raNITIdine (ZANTAC) 150 mg tablet Take 150 mg by mouth. TAKEN OFF MARKET     No current facility-administered medications for this visit. Medications Discontinued During This Encounter   Medication Reason    amLODIPine (NORVASC) 5 mg tablet     lisinopriL (PRINIVIL, ZESTRIL) 10 mg tablet Reorder     ~~~~~~~~~~~~~~~~~~~~~~~~~~~~~~~~~~~~~~~~~~~~~~    Chief Complaint   Patient presents with    Medication Refill     Amlodipine       History of Present Illness   Pavel Vanegas is a [de-identified] y.o. male who presents for:    Hypertension: Exacerbation   BP Readings from Last 3 Encounters:   05/15/20 106/64   10/31/19 125/71   09/19/19 138/75     The patient reports:  taking medications as instructed, no medication side effects noted, no TIA's, no chest pain on exertion, no dyspnea on exertion, no swelling of ankles, does note some dizziness when arising. Home monitoring:Yes: Comment: see vitals       Review of Systems   Review of Systems   Constitutional: Negative for chills and fever.    HENT: Negative for congestion, ear discharge and sore throat. Eyes: Negative for double vision, photophobia and discharge. Respiratory: Negative for cough, sputum production, shortness of breath and wheezing. Cardiovascular: Negative for chest pain, palpitations and leg swelling. Gastrointestinal: Negative for diarrhea, nausea and vomiting. Genitourinary: Negative for dysuria and urgency. Skin: Negative. Neurological: Positive for dizziness. Negative for tremors and headaches. Vitals/Objective:     Due to this being a TeleHealth evaluation, many elements of the physical examination are unable to be assessed. Visit Vitals  /64   Pulse 81   Temp 96.8 °F (36 °C)       Physical Exam  Constitutional:       General: He is not in acute distress. Appearance: Normal appearance. He is not ill-appearing, toxic-appearing or diaphoretic. HENT:      Head: Normocephalic and atraumatic. Right Ear: External ear normal.      Left Ear: External ear normal.   Eyes:      General:         Right eye: No discharge. Left eye: No discharge. Conjunctiva/sclera: Conjunctivae normal.   Pulmonary:      Effort: Pulmonary effort is normal.   Skin:     Coloration: Skin is not pale. Findings: No erythema. Neurological:      General: No focal deficit present. Mental Status: He is alert. Cranial Nerves: No cranial nerve deficit (  No Facial Asymmetry (Cranial nerve 7 motor function) (limited exam due to video visit)  ). Comments: Able to follow commands   Psychiatric:         Mood and Affect: Mood normal.         Behavior: Behavior normal.         Thought Content: Thought content normal.          No results found for this or any previous visit (from the past 24 hour(s)). Disposition     Follow-up and Dispositions  ·   Return in about 6 months (around 11/15/2020) for Routine (Chronic Conditions). No future appointments.     History   Patient's past medical, surgical and family histories were reviewed and updated.     Past Medical History:   Diagnosis Date    CAD (coronary artery disease)     DVT (deep venous thrombosis) (HCC)     GERD (gastroesophageal reflux disease)     Heart murmur     HTN (hypertension)     Hyperlipidemia     Mild anemia     Pulmonary embolism (HCC)      Past Surgical History:   Procedure Laterality Date    HX CORONARY ARTERY BYPASS GRAFT      [facility]     Family History   Problem Relation Age of Onset    Stroke Mother     Heart Attack Mother     Stroke Father     Heart Attack Father     Coronary Artery Disease Brother     Deep Vein Thrombosis Brother     Deep Vein Thrombosis Brother     Coronary Artery Disease Brother     Deep Vein Thrombosis Brother     Cancer Brother         Throat cancer    Coronary Artery Disease Brother      Social History     Tobacco Use    Smoking status: Former Smoker    Smokeless tobacco: Never Used   Substance Use Topics    Alcohol use: Yes     Frequency: Never     Comment: OCASSIONALLY    Drug use: No       Allergies     Allergies   Allergen Reactions    Lipitor [Atorvastatin] Myalgia

## 2020-11-18 ENCOUNTER — OFFICE VISIT (OUTPATIENT)
Dept: FAMILY MEDICINE CLINIC | Age: 80
End: 2020-11-18
Payer: MEDICARE

## 2020-11-18 VITALS
OXYGEN SATURATION: 95 % | DIASTOLIC BLOOD PRESSURE: 91 MMHG | HEIGHT: 72 IN | TEMPERATURE: 98.5 F | WEIGHT: 213.2 LBS | HEART RATE: 86 BPM | BODY MASS INDEX: 28.88 KG/M2 | SYSTOLIC BLOOD PRESSURE: 133 MMHG | RESPIRATION RATE: 16 BRPM

## 2020-11-18 DIAGNOSIS — I10 ESSENTIAL HYPERTENSION: Primary | ICD-10-CM

## 2020-11-18 DIAGNOSIS — Z71.89 ADVANCED DIRECTIVES, COUNSELING/DISCUSSION: ICD-10-CM

## 2020-11-18 DIAGNOSIS — E78.2 MIXED HYPERLIPIDEMIA: ICD-10-CM

## 2020-11-18 DIAGNOSIS — Z00.00 MEDICARE ANNUAL WELLNESS VISIT, SUBSEQUENT: ICD-10-CM

## 2020-11-18 DIAGNOSIS — I48.0 PAROXYSMAL ATRIAL FIBRILLATION (HCC): ICD-10-CM

## 2020-11-18 DIAGNOSIS — R73.01 IMPAIRED FASTING GLUCOSE: ICD-10-CM

## 2020-11-18 LAB
ANION GAP SERPL CALC-SCNC: 9 MMOL/L (ref 5–15)
BUN SERPL-MCNC: 23 MG/DL (ref 6–20)
BUN/CREAT SERPL: 22 (ref 12–20)
CALCIUM SERPL-MCNC: 9.8 MG/DL (ref 8.5–10.1)
CHLORIDE SERPL-SCNC: 107 MMOL/L (ref 97–108)
CHOLEST SERPL-MCNC: 192 MG/DL
CO2 SERPL-SCNC: 22 MMOL/L (ref 21–32)
CREAT SERPL-MCNC: 1.05 MG/DL (ref 0.7–1.3)
EST. AVERAGE GLUCOSE BLD GHB EST-MCNC: 128 MG/DL
GLUCOSE SERPL-MCNC: 141 MG/DL (ref 65–100)
HBA1C MFR BLD: 6.1 % (ref 4–5.6)
HDLC SERPL-MCNC: 77 MG/DL
HDLC SERPL: 2.5 {RATIO} (ref 0–5)
LDLC SERPL CALC-MCNC: 87.6 MG/DL (ref 0–100)
LIPID PROFILE,FLP: NORMAL
POTASSIUM SERPL-SCNC: 5 MMOL/L (ref 3.5–5.1)
SODIUM SERPL-SCNC: 138 MMOL/L (ref 136–145)
TRIGL SERPL-MCNC: 137 MG/DL (ref ?–150)
TSH SERPL DL<=0.05 MIU/L-ACNC: 2.69 UIU/ML (ref 0.36–3.74)
VLDLC SERPL CALC-MCNC: 27.4 MG/DL

## 2020-11-18 PROCEDURE — G8419 CALC BMI OUT NRM PARAM NOF/U: HCPCS | Performed by: FAMILY MEDICINE

## 2020-11-18 PROCEDURE — 1101F PT FALLS ASSESS-DOCD LE1/YR: CPT | Performed by: FAMILY MEDICINE

## 2020-11-18 PROCEDURE — G8755 DIAS BP > OR = 90: HCPCS | Performed by: FAMILY MEDICINE

## 2020-11-18 PROCEDURE — G8427 DOCREV CUR MEDS BY ELIG CLIN: HCPCS | Performed by: FAMILY MEDICINE

## 2020-11-18 PROCEDURE — G8510 SCR DEP NEG, NO PLAN REQD: HCPCS | Performed by: FAMILY MEDICINE

## 2020-11-18 PROCEDURE — G0439 PPPS, SUBSEQ VISIT: HCPCS | Performed by: FAMILY MEDICINE

## 2020-11-18 PROCEDURE — G8752 SYS BP LESS 140: HCPCS | Performed by: FAMILY MEDICINE

## 2020-11-18 PROCEDURE — 99214 OFFICE O/P EST MOD 30 MIN: CPT | Performed by: FAMILY MEDICINE

## 2020-11-18 PROCEDURE — G8536 NO DOC ELDER MAL SCRN: HCPCS | Performed by: FAMILY MEDICINE

## 2020-11-18 RX ORDER — LISINOPRIL 10 MG/1
10 TABLET ORAL DAILY
Qty: 90 TAB | Refills: 1 | Status: SHIPPED | OUTPATIENT
Start: 2020-11-18 | End: 2021-04-05 | Stop reason: SDUPTHER

## 2020-11-18 RX ORDER — AMLODIPINE BESYLATE 2.5 MG/1
2.5 TABLET ORAL DAILY
Qty: 90 TAB | Refills: 1 | Status: SHIPPED
Start: 2020-11-18 | End: 2021-05-17

## 2020-11-18 NOTE — ACP (ADVANCE CARE PLANNING)
Advance Care Planning     General Advance Care Planning (ACP) Conversation      Date of Conversation: 11/18/2020  Conducted with: Patient with Decision Making Capacity    Healthcare Decision Maker:     Click here to complete [place] including selection of the [place] Relationship (ie \"Primary\")  Today we documented Decision Maker(s) consistent with Legal Next of Kin hierarchy. His daughter Emiliano Faulkner  Content/Action Overview:    Has ACP document(s) NOT on file - requested patient to provide    Length of Voluntary ACP Conversat ion in minutes:  <16 minutes (Non-Billable)    Kristie Felton MD

## 2020-11-18 NOTE — PROGRESS NOTES
Patient stated name &     Chief Complaint   Patient presents with    Medication Refill     Lisinopril    Labs     Requesting labs        Health Maintenance Due   Topic    DTaP/Tdap/Td series (1 - Tdap)    GLAUCOMA SCREENING Q2Y     Pneumococcal 65+ years (2 of 2 - PPSV23)    Flu Vaccine (1)    Medicare Yearly Exam        Wt Readings from Last 3 Encounters:   20 213 lb 3.2 oz (96.7 kg)   10/31/19 221 lb (100.2 kg)   19 210 lb (95.3 kg)     Temp Readings from Last 3 Encounters:   20 98.5 °F (36.9 °C) (Oral)   05/15/20 96.8 °F (36 °C)   10/31/19 97.8 °F (36.6 °C) (Oral)     BP Readings from Last 3 Encounters:   20 (!) 144/93   05/15/20 106/64   10/31/19 125/71     Pulse Readings from Last 3 Encounters:   20 (!) 114   05/15/20 81   10/31/19 64         Learning Assessment:  :     No flowsheet data found. Depression Screening:  :     3 most recent PHQ Screens 2020   Little interest or pleasure in doing things Not at all   Feeling down, depressed, irritable, or hopeless Not at all   Total Score PHQ 2 0       Fall Risk Assessment:  :     Fall Risk Assessment, last 12 mths 2020   Able to walk? Yes   Fall in past 12 months? No       Abuse Screening:  :     Abuse Screening Questionnaire 2019   Do you ever feel afraid of your partner? N   Are you in a relationship with someone who physically or mentally threatens you? N   Is it safe for you to go home? Y       Coordination of Care Questionnaire:  :     1) Have you been to an emergency room, urgent care clinic since your last visit? No    Hospitalized since your last visit? No             2) Have you seen or consulted any other health care providers outside of [address] [place] [address] since your last visit?  No  (Include any pap smears or colon screenings in this section.)    Patient is accompanied by self I have received verbal consent from Jayshree eBal to discuss any/all medical information while they are present in the room.

## 2020-11-18 NOTE — PROGRESS NOTES
This is the Subsequent Medicare Annual Wellness Exam, performed 12 months or more after the Initial AWV or the last Subsequent AWV    I have reviewed the patient's medical history in detail and updated the computerized patient record. History     Past Medical History:   Diagnosis Date    CAD (coronary artery disease)     DVT (deep venous thrombosis) (HCC)     GERD (gastroesophageal reflux disease)     Heart murmur     HTN (hypertension)     Hyperlipidemia     Mild anemia     Pulmonary embolism (HCC)       Past Surgical History:   Procedure Laterality Date    HX CORONARY ARTERY BYPASS GRAFT      07/24/2007 by [facility] Rafy Gut[facility]    Lipitor [Atorvastatin] Myalgia     Family History   Problem Relation Age of Onset    Stroke Mother     Heart Attack Mother     Stroke Father     Heart Attack Father     Coronary Artery Disease Brother     Deep Vein Thrombosis Brother     Deep Vein Thrombosis Brother     Coronary Artery Disease Brother     Deep Vein Thrombosis Brother     Cancer Brother         Throat cancer    Coronary Artery Disease Brother      Social History     Tobacco Use    Smoking status: Former Smoker    Smokeless tobacco: Never Used   Substance Use Topics    Alcohol use: Yes     Frequency: Never     Comment: OCASSIONALLY     Depression Risk Factor Screening:     3 most recent PHQ Screens 11/18/2020   Little interest or pleasure in doing things Not at all   Feeling down, depressed, irritable, or hopeless Not at all   Total Score PHQ 2 0     Alcohol Risk Factor Screening:     AUDIT-C 11/18/2020 9/19/2019   How often do you have a drink containing alcohol? 1 1   How many standard drinks containing alcohol do you have on a typical day? 0 0   How often do you have six or more drinks on one occasion? 0 0   Audit-C Score 1 1     Functional Ability and Level of Safety:   Hearing Loss  Hearing is good.     Activities of Daily Living  ADL Assessment 11/18/2020   Feeding yourself No Help Needed   Getting from bed to chair No Help Needed   Getting dressed No Help Needed   Bathing or showering No Help Needed   Walk across the room (includes cane/walker) No Help Needed   Using the telphone No Help Needed   Taking your medications No Help Needed   Preparing meals No Help Needed   Managing money (expenses/bills) No Help Needed   Moderately strenuous housework (laundry) No Help Needed   Shopping for personal items (toiletries/medicines) No Help Needed   Shopping for groceries No Help Needed   Driving No Help Needed   Climbing a flight of stairs No Help Needed   Getting to places beyond walking distances No Help Needed       Fall Risk  Fall Risk Assessment, last 12 mths 11/18/2020   Able to walk? Yes   Fall in past 12 months? No       Abuse Screen  Abuse Screening Questionnaire 11/18/2020   Do you ever feel afraid of your partner? N   Are you in a relationship with someone who physically or mentally threatens you? N   Is it safe for you to go home? Y     Cognitive Screening   Evaluation of Cognitive Function:  Has your family/caregiver stated any concerns about your memory: no  No flowsheet data found. Patient Care Team   Patient Care Team:  Torie Russell MD as PCP - General (Family Medicine)  Torie Russell MD as PCP - [facility]  Tavares Benoit DO (Pulmonary Disease)  Jelena Monaco MD (Cardiology)  Assessment/Plan   Education and counseling provided:  Are appropriate based on today's review and evaluation  End-of-Life planning (with patient's consent)  Pneumococcal Vaccine    Diagnoses and all orders for this visit:    1. Impaired fasting glucose  -     HEMOGLOBIN A1C WITH EAG; Future    2. Essential hypertension  -     METABOLIC PANEL, BASIC; Future  -     TSH 3RD GENERATION; Future  -     amLODIPine (NORVASC) 2.5 mg tablet; Take 1 Tab by mouth daily. -     lisinopriL (PRINIVIL, ZESTRIL) 10 mg tablet;  Take 1 Tab by mouth daily. 3. Mixed hyperlipidemia  -     LIPID PANEL; Future    4.  Paroxysmal atrial fibrillation [facility])        Health Maintenance Topics with due status: Overdue       Topic Date Due    DTaP/Tdap/Td series 04/19/1961    GLAUCOMA SCREENING Q2Y 04/19/2005    Pneumococcal 65+ years 07/19/2018    Medicare Yearly Exam 09/19/2020     Health Maintenance Topics with due status: Completed       Topic Last Completion Date    Shingrix Vaccine Age 50> 08/13/2020    Flu Vaccine 11/11/2020

## 2020-11-18 NOTE — PROGRESS NOTES
Abdirashid Moore  [de-identified] y.o. male  1940  [facility]:[id number]    [facility] MEDICINE  Progress Note     Encounter Date: 11/18/2020    Assessment and Plan:     Encounter Diagnoses     ICD-10-CM ICD-9-CM   1. Essential hypertension  I10 401.9   2. Paroxysmal atrial fibrillation (HCC)  I48.0 427.31   3. Impaired fasting glucose  R73.01 790.21   4. Mixed hyperlipidemia  E78.2 272.2   5. Medicare annual wellness visit, subsequent  Z00.00 V70.0   6. Advanced directives, counseling/discussion  Z71.89 V65.49       1. Essential hypertension  Stable. Continue current medication.   - METABOLIC PANEL, BASIC; Future  - TSH 3RD GENERATION; Future  - amLODIPine (NORVASC) 2.5 mg tablet; Take 1 Tab by mouth daily. Dispense: 90 Tab; Refill: 1  - lisinopriL (PRINIVIL, ZESTRIL) 10 mg tablet; Take 1 Tab by mouth daily. Dispense: 90 Tab; Refill: 1    2. Paroxysmal atrial fibrillation (HCC)  Called to Dr. Lima Denton office regarding any additional labs to be ordered. Patient is rate controlled today. Has follow up with cardiology.   - METABOLIC PANEL, BASIC; Future  - TSH 3RD GENERATION; Future    3. Impaired fasting glucose  Monitoring  - HEMOGLOBIN A1C WITH EAG; Future    4. Mixed hyperlipidemia  - LIPID PANEL; Future      I have discussed the diagnosis with the patient and the intended plan as seen in the above orders. he has expressed understanding. The patient has received an after-visit summary and questions were answered concerning future plans. I have discussed medication side effects and warnings with the patient as well. Electronically Signed: Bonner Barthel, MD    Current Medications after this visit     Current Outpatient Medications   Medication Sig    amLODIPine (NORVASC) 2.5 mg tablet Take 1 Tab by mouth daily.  lisinopriL (PRINIVIL, ZESTRIL) 10 mg tablet Take 1 Tab by mouth daily.  aspirin 81 mg chewable tablet Take 81 mg by mouth daily.     apixaban (ELIQUIS) 5 mg tablet Take 5 mg by mouth two (2) times a day.  ezetimibe (ZETIA) 10 mg tablet Take 10 mg by mouth daily.  diphenhydrAMINE (BENADRYL ALLERGY) 25 mg tablet Take 25 mg by mouth every six (6) hours as needed for Sleep. No current facility-administered medications for this visit. Medications Discontinued During This Encounter   Medication Reason    raNITIdine (ZANTAC) 150 mg tablet Not A Current Medication    amLODIPine (NORVASC) 2.5 mg tablet REORDER    lisinopriL (PRINIVIL, ZESTRIL) 10 mg tablet REORDER     ~~~~~~~~~~~~~~~~~~~~~~~~~~~~~~~~~~~~~~~~~~~~~~    Chief Complaint   Patient presents with    Medication Refill     Lisinopril    Labs     Requesting labs       History provided by patient  History of Present Illness   Gunner Swift is a [de-identified] y.o. male who presents to clinic today for:  Hypertension: Controlled   BP Readings from Last 3 Encounters:   11/18/20 (!) 133/91   05/15/20 106/64   10/31/19 125/71     The patient reports:  taking medications as instructed, no medication side effects noted, no TIA's, no chest pain on exertion, no dyspnea on exertion, no swelling of ankles. Home monitoring:No    Hyperlipidemia:  Stable  Cardiovascular risks for him are: existing CAD  hypertension  hyperlipidemia. Currently he takes zetia  Myalgias: No  Cholesterol, total   Date Value Ref Range Status   09/23/2019 210 (H) 100 - 199 mg/dL Final     HDL Cholesterol   Date Value Ref Range Status   09/23/2019 58 >39 mg/dL Final     LDL, calculated   Date Value Ref Range Status   09/23/2019 99 0 - 99 mg/dL Final     Triglyceride   Date Value Ref Range Status   09/23/2019 266 (H) 0 - 149 mg/dL Final     ALT (SGPT)   Date Value Ref Range Status   09/23/2019 54 (H) 0 - 44 IU/L Final     Alk.  phosphatase   Date Value Ref Range Status   09/23/2019 45 39 - 117 IU/L Final       IFG Follow up: Stable   Overall the patient feels his dietary compliance is Fair   Diabetic Consultants:Endocrinologist - N/A   Last HbA1c: No results found for: HBA1C, HGBE8, NNC8YFHD, VXD4TOOU, OWS7EYLD   Therapy:diet   Medication compliance:n/a   Frequency of home glucose testing: N/A   Blood Sugar range at home: N/A   Polyuria, polyphagia or polydipsia: NO   Low blood sugar symptoms: No        Afib  Patient present with cc of afib. Patient reports that he has been following with cardiology and at his appointment eariler this week he was diagnosed with Afib. No medication changes were made as doctor is awaiting blood work prior to starting new medication. Health Maintenance  VIIS queried and reviewed; updated in chart as appropriate.,  recommendation discussed and ordered with patient's permission. Health Maintenance Due   Topic Date Due    DTaP/Tdap/Td series (1 - Tdap) 04/19/1961    GLAUCOMA SCREENING Q2Y  04/19/2005    Pneumococcal 65+ years (2 of 2 - PPSV23) 07/19/2018    Medicare Yearly Exam  09/19/2020     Review of Systems   Review of Systems   Constitutional: Negative for chills and fever. HENT: Negative for congestion, ear discharge and sore throat. Eyes: Negative for double vision, photophobia and discharge. Respiratory: Negative for cough, sputum production, shortness of breath and wheezing. Cardiovascular: Negative for chest pain, palpitations and leg swelling. Gastrointestinal: Negative for diarrhea, nausea and vomiting. Genitourinary: Negative for dysuria and urgency. Skin: Negative. Neurological: Negative for dizziness, tremors and headaches. Vitals/Objective:     Vitals:    11/18/20 0837 11/18/20 0838 11/18/20 0902   BP: (!) 174/124 (!) 144/93 (!) 133/91   Pulse: (!) 114  86   Resp: 16     Temp: 98.5 °F (36.9 °C)     TempSrc: Oral     SpO2: 95%     Weight: 213 lb 3.2 oz (96.7 kg)     Height: 6' (1.829 m)       Body mass index is 28.92 kg/m².     Wt Readings from Last 3 Encounters:   11/18/20 213 lb 3.2 oz (96.7 kg)   10/31/19 221 lb (100.2 kg)   09/19/19 210 lb (95.3 kg)       Physical Exam  Constitutional: General: He is not in acute distress. Appearance: Normal appearance. He is well-developed. He is not diaphoretic. HENT:      Head: Normocephalic and atraumatic. Right Ear: Tympanic membrane, ear canal and external ear normal.      Left Ear: Tympanic membrane, ear canal and external ear normal.      Mouth/Throat:      Pharynx: No oropharyngeal exudate or posterior oropharyngeal erythema. Eyes:      General:         Right eye: No discharge. Left eye: No discharge. Conjunctiva/sclera: Conjunctivae normal.   Cardiovascular:      Rate and Rhythm: Normal rate. Rhythm irregular. Heart sounds: S1 normal and S2 normal. Murmur present. No gallop. Pulmonary:      Effort: Pulmonary effort is normal.      Breath sounds: Normal breath sounds. No wheezing, rhonchi or rales. Musculoskeletal:      Right lower leg: No edema. Left lower leg: No edema. Skin:     General: Skin is warm and dry. Neurological:      Mental Status: He is alert and oriented to person, place, and time. No results found for this or any previous visit (from the past 24 hour(s)). Disposition     Follow-up and Dispositions  ·   Return in about 6 months (around 5/18/2021) for Routine (Chronic Conditions). No future appointments. History   Patient's past medical, surgical and family histories were reviewed and updated.     Past Medical History:   Diagnosis Date    CAD (coronary artery disease)     DVT (deep venous thrombosis) (HCC)     GERD (gastroesophageal reflux disease)     Heart murmur     HTN (hypertension)     Hyperlipidemia     Mild anemia     Pulmonary embolism (HCC)      Past Surgical History:   Procedure Laterality Date    HX CORONARY ARTERY BYPASS GRAFT      07/24/2007 by Dr. Addison Self at [facility]     Family History   Problem Relation Age of Onset    Stroke Mother     Heart Attack Mother     Stroke Father     Heart Attack Father     Coronary Artery Disease Brother     Deep Vein Thrombosis Brother     Deep Vein Thrombosis Brother     Coronary Artery Disease Brother     Deep Vein Thrombosis Brother     Cancer Brother         Throat cancer    Coronary Artery Disease Brother      Social History     Tobacco Use    Smoking status: Former Smoker    Smokeless tobacco: Never Used   Substance Use Topics    Alcohol use: Yes     Frequency: Never     Comment: OCASSIONALLY    Drug use: No       Allergies     Allergies   Allergen Reactions    Lipitor [Atorvastatin] Myalgia

## 2020-11-18 NOTE — PATIENT INSTRUCTIONS
Medicare Wellness Visit, Male The best way to live healthy is to have a lifestyle where you eat a well-balanced diet, exercise regularly, limit alcohol use, and quit all forms of tobacco/nicotine, if applicable. Regular preventive services are another way to keep healthy. Preventive services (vaccines, screening tests, monitoring & exams) can help personalize your care plan, which helps you manage your own care. Screening tests can find health problems at the earliest stages, when they are easiest to treat. [facility] follows the current, evidence-based guidelines published by the [facility] ([facility]) when recommending preventive services for our patients. Because we follow these guidelines, sometimes recommendations change over time as research supports it. (For example, a prostate screening blood test is no longer routinely recommended for men with no symptoms). Of course, you and your doctor may decide to screen more often for some diseases, based on your risk and co-morbidities (chronic disease you are already diagnosed with). Preventive services for you include: - Medicare offers their members a free annual wellness visit, which is time for you and your primary care provider to discuss and plan for your preventive service needs. Take advantage of this benefit every year! 
-All adults over age 72 should receive the recommended pneumonia vaccines. Current USPSTF guidelines recommend a series of two vaccines for the best pneumonia protection.  
-All adults should have a flu vaccine yearly and tetanus vaccine every 10 years. 
-All adults age 48 and older should receive the shingles vaccines (series of two vaccines).       
-All adults age 38-68 who are overweight should have a diabetes screening test once every three years.  
-Other screening tests & preventive services for persons with diabetes include: an eye exam to screen for diabetic retinopathy, a kidney function test, a foot exam, and stricter control over your cholesterol.  
-Cardiovascular screening for adults with routine risk involves an electrocardiogram (ECG) at intervals determined by the provider.  
-Colorectal cancer screening should be done for adults age 54-65 with no increased risk factors for colorectal cancer. There are a number of acceptable methods of screening for this type of cancer. Each test has its own benefits and drawbacks. Discuss with your provider what is most appropriate for you during your annual wellness visit. The different tests include: colonoscopy (considered the best screening method), a fecal occult blood test, a fecal DNA test, and sigmoidoscopy. 
-All adults born between [facility] should be screened once for Hepatitis C. 
-An Abdominal Aortic Aneurysm (AAA) Screening is recommended for men age 73-68 who has ever smoked in their lifetime. Here is a list of your current Health Maintenance items (your personalized list of preventive services) with a due date: 
Health Maintenance Due Topic Date Due  
 DTaP/Tdap/Td  (1 - Tdap) 04/19/1961  Glaucoma Screening   04/19/2005  Pneumococcal Vaccine (2 of 2 - PPSV23) 07/19/2018 Shannan Barrera Annual Well Visit  09/19/2020

## 2020-12-31 LAB — SARS-COV-2, NAA: NEGATIVE

## 2021-02-26 DIAGNOSIS — I10 ESSENTIAL HYPERTENSION: ICD-10-CM

## 2021-02-26 RX ORDER — LISINOPRIL 10 MG/1
10 TABLET ORAL DAILY
Qty: 90 TAB | Refills: 1 | OUTPATIENT
Start: 2021-02-26

## 2021-04-05 DIAGNOSIS — I10 ESSENTIAL HYPERTENSION: ICD-10-CM

## 2021-04-05 RX ORDER — LISINOPRIL 10 MG/1
10 TABLET ORAL DAILY
Qty: 90 TAB | Refills: 1 | Status: SHIPPED | OUTPATIENT
Start: 2021-04-05 | End: 2021-05-17 | Stop reason: SDUPTHER

## 2021-05-17 ENCOUNTER — OFFICE VISIT (OUTPATIENT)
Dept: FAMILY MEDICINE CLINIC | Age: 81
End: 2021-05-17
Payer: MEDICARE

## 2021-05-17 VITALS
WEIGHT: 209.6 LBS | RESPIRATION RATE: 20 BRPM | SYSTOLIC BLOOD PRESSURE: 178 MMHG | BODY MASS INDEX: 28.39 KG/M2 | HEART RATE: 61 BPM | DIASTOLIC BLOOD PRESSURE: 88 MMHG | TEMPERATURE: 98.7 F | OXYGEN SATURATION: 99 % | HEIGHT: 72 IN

## 2021-05-17 DIAGNOSIS — I10 ESSENTIAL HYPERTENSION: ICD-10-CM

## 2021-05-17 DIAGNOSIS — I48.0 PAROXYSMAL ATRIAL FIBRILLATION (HCC): ICD-10-CM

## 2021-05-17 PROCEDURE — 99214 OFFICE O/P EST MOD 30 MIN: CPT | Performed by: FAMILY MEDICINE

## 2021-05-17 PROCEDURE — G8536 NO DOC ELDER MAL SCRN: HCPCS | Performed by: FAMILY MEDICINE

## 2021-05-17 PROCEDURE — G8754 DIAS BP LESS 90: HCPCS | Performed by: FAMILY MEDICINE

## 2021-05-17 PROCEDURE — G8427 DOCREV CUR MEDS BY ELIG CLIN: HCPCS | Performed by: FAMILY MEDICINE

## 2021-05-17 PROCEDURE — 1101F PT FALLS ASSESS-DOCD LE1/YR: CPT | Performed by: FAMILY MEDICINE

## 2021-05-17 PROCEDURE — G8510 SCR DEP NEG, NO PLAN REQD: HCPCS | Performed by: FAMILY MEDICINE

## 2021-05-17 PROCEDURE — G8753 SYS BP > OR = 140: HCPCS | Performed by: FAMILY MEDICINE

## 2021-05-17 PROCEDURE — G8419 CALC BMI OUT NRM PARAM NOF/U: HCPCS | Performed by: FAMILY MEDICINE

## 2021-05-17 RX ORDER — LISINOPRIL 10 MG/1
20 TABLET ORAL DAILY
Qty: 90 TAB | Refills: 1
Start: 2021-05-17 | End: 2021-06-14 | Stop reason: ALTCHOICE

## 2021-05-17 RX ORDER — DILTIAZEM HYDROCHLORIDE 180 MG/1
CAPSULE, EXTENDED RELEASE ORAL
COMMUNITY
Start: 2021-03-12

## 2021-05-17 RX ORDER — PANTOPRAZOLE SODIUM 40 MG/1
TABLET, DELAYED RELEASE ORAL
COMMUNITY
Start: 2021-03-23 | End: 2021-05-17

## 2021-05-17 RX ORDER — AMIODARONE HYDROCHLORIDE 200 MG/1
TABLET ORAL
COMMUNITY
Start: 2021-03-03 | End: 2021-05-17

## 2021-05-17 RX ORDER — SUCRALFATE 1 G/1
TABLET ORAL
COMMUNITY
Start: 2021-03-23 | End: 2021-05-17

## 2021-05-17 RX ORDER — ROSUVASTATIN CALCIUM 5 MG/1
TABLET, COATED ORAL
COMMUNITY
Start: 2021-03-01 | End: 2022-07-06

## 2021-05-17 NOTE — PROGRESS NOTES
Patient stated name &   Chief Complaint   Patient presents with    Follow-up     6 months        Health Maintenance Due   Topic    DTaP/Tdap/Td series (1 - Tdap)    Pneumococcal 65+ years (2 of 2 - PPSV23)       Wt Readings from Last 3 Encounters:   21 209 lb 9.6 oz (95.1 kg)   20 213 lb 3.2 oz (96.7 kg)   10/31/19 221 lb (100.2 kg)     Temp Readings from Last 3 Encounters:   21 98.7 °F (37.1 °C) (Temporal)   20 98.5 °F (36.9 °C) (Oral)   05/15/20 96.8 °F (36 °C)     BP Readings from Last 3 Encounters:   21 (!) 173/81   20 (!) 133/91   05/15/20 106/64     Pulse Readings from Last 3 Encounters:   21 61   20 86   05/15/20 81         Learning Assessment:  :     No flowsheet data found. Depression Screening:  :     3 most recent PHQ Screens 2021   Little interest or pleasure in doing things Not at all   Feeling down, depressed, irritable, or hopeless Not at all   Total Score PHQ 2 0       Fall Risk Assessment:  :     Fall Risk Assessment, last 12 mths 2021   Able to walk? Yes   Fall in past 12 months? 0   Do you feel unsteady? 0   Are you worried about falling 0       Abuse Screening:  :     Abuse Screening Questionnaire 2020   Do you ever feel afraid of your partner? N N   Are you in a relationship with someone who physically or mentally threatens you? N N   Is it safe for you to go home? Y Y       Coordination of Care Questionnaire:  :     1) Have you been to an emergency room, urgent care clinic since your last visit? No  Hospitalized since your last visit? No             2) Have you seen or consulted any other health care providers outside of [address] [place] [address] since your last visit? No in this section.)    Patient is accompanied by self I have received verbal consent from Tori Brown to discuss any/all medical information while they are present in the room.

## 2021-05-17 NOTE — PROGRESS NOTES
Christy Fisher  80 y.o. male  1940  [facility]:[id number]  [facility] FAMILY MEDICINE  Progress Note     Encounter Date: 5/17/2021    Assessment and Plan:     Encounter Diagnoses     ICD-10-CM ICD-9-CM   1. Essential hypertension  I10 401.9   2. Paroxysmal atrial fibrillation (HCC)  I48.0 427.31       1. Essential hypertension  Not at goal  Getting Sleep study with cardiology  Increase lisinopril to 20 mg   BP checks with nurses weekly  FU one month  - lisinopriL (PRINIVIL, ZESTRIL) 10 mg tablet; Take 2 Tabs by mouth daily. Dispense: 90 Tab; Refill: 1    2. Paroxysmal atrial fibrillation (HCC)  S/p ablation and doing well  Getting off of amiodarone. Today is his last dose      I have discussed the diagnosis with the patient and the intended plan as seen in the above orders. he has expressed understanding. The patient has received an after-visit summary and questions were answered concerning future plans. I have discussed medication side effects and warnings with the patient as well. Electronically Signed: Lynette Hodges MD    Current Medications after this visit     Current Outpatient Medications   Medication Sig    lisinopriL (PRINIVIL, ZESTRIL) 10 mg tablet Take 2 Tabs by mouth daily.  dilTIAZem ER (TIAZAC) 180 mg capsule     rosuvastatin (CRESTOR) 5 mg tablet     apixaban (ELIQUIS) 5 mg tablet Take 5 mg by mouth two (2) times a day.  ezetimibe (ZETIA) 10 mg tablet Take 10 mg by mouth daily.  diphenhydrAMINE (BENADRYL ALLERGY) 25 mg tablet Take 25 mg by mouth every six (6) hours as needed for Sleep. No current facility-administered medications for this visit.       Medications Discontinued During This Encounter   Medication Reason    amiodarone (CORDARONE) 200 mg tablet Not A Current Medication    aspirin 81 mg chewable tablet Not A Current Medication    amLODIPine (NORVASC) 2.5 mg tablet Not A Current Medication    pantoprazole (PROTONIX) 40 mg tablet Not A Current Medication    sucralfate (CARAFATE) 1 gram tablet Not A Current Medication    lisinopriL (PRINIVIL, ZESTRIL) 10 mg tablet REORDER     ~~~~~~~~~~~~~~~~~~~~~~~~~~~~~~~~~~~~~~~~~~~~~~~~~~~~~~~~~~~    Chief Complaint   Patient presents with    Follow-up     6 months       History provided by patient  History of Present Illness   Gabriel Flannery is a 80 y.o. male who presents to clinic today for:  Follow-up (6 months)    Hypertension  BP up when he saw Cardiology  Up today    Hypercholesterolemia  On statin and zetia    Anticoagulation  No blood anywhere. For A fib  Recent ablation and now in normal rhythm  Stopping amiodarone today. Health Maintenance  Will do at future visit  Health Maintenance Due   Topic Date Due    DTaP/Tdap/Td series (1 - Tdap) Never done    Pneumococcal 65+ years (2 of 2 - PPSV23) 07/19/2018     Review of Systems   Review of Systems   Constitutional: Negative for chills, fever and weight loss. HENT: Positive for hearing loss. Negative for congestion and sore throat. Respiratory: Negative for cough and shortness of breath. Cardiovascular: Negative for chest pain, palpitations and leg swelling. Gastrointestinal: Negative for blood in stool. Genitourinary: Negative for hematuria. Psychiatric/Behavioral: Negative. Vitals/Objective:     Vitals:    05/17/21 0842 05/17/21 0848 05/17/21 0915   BP: (!) 184/78 (!) 173/81 (!) 178/88   Pulse: 61     Resp: 20     Temp: 98.7 °F (37.1 °C)     TempSrc: Temporal     SpO2: 99%     Weight: 209 lb 9.6 oz (95.1 kg)     Height: 6' (1.829 m)       Body mass index is 28.43 kg/m². Wt Readings from Last 3 Encounters:   05/17/21 209 lb 9.6 oz (95.1 kg)   11/18/20 213 lb 3.2 oz (96.7 kg)   10/31/19 221 lb (100.2 kg)         Objective  Physical Exam  Vitals signs and nursing note reviewed. Constitutional:       Appearance: Normal appearance. He is not toxic-appearing. HENT:      Head: Normocephalic and atraumatic.    Neck: Musculoskeletal: No muscular tenderness. Cardiovascular:      Rate and Rhythm: Normal rate and regular rhythm. Heart sounds: Murmur (high pitched systolic squeaking murmur) present. Systolic murmur present. No gallop. Pulmonary:      Effort: Pulmonary effort is normal. No respiratory distress. Breath sounds: Normal breath sounds. No wheezing, rhonchi or rales. Musculoskeletal:      Right lower leg: No edema. Left lower leg: No edema. Lymphadenopathy:      Cervical: No cervical adenopathy. Neurological:      Mental Status: He is alert. Psychiatric:         Mood and Affect: Mood normal.         Behavior: Behavior normal.         Thought Content: Thought content normal.         Judgment: Judgment normal.         No results found for this or any previous visit (from the past 24 hour(s)). Disposition     Follow-up and Dispositions  ·   Return in about 1 month (around 6/17/2021) for Blood pressure follow up. No future appointments. History   Patient's past medical, surgical and family histories were reviewed and updated.     Past Medical History:   Diagnosis Date    Aortic stenosis     by ECHO 2019    CAD (coronary artery disease)     DVT (deep venous thrombosis) (HCC)     GERD (gastroesophageal reflux disease)     HTN (hypertension)     Hyperlipidemia     Mild anemia     Pulmonary embolism ([address]      Past Surgical History:   Procedure Laterality Date    HX CORONARY ARTERY BYPASS GRAFT      07/24/2007 by Dr[facility] Papito Thompson at [facility]     Family History   Problem Relation Age of Onset    Stroke Mother     Heart Attack Mother     Stroke Father     Heart Attack Father     Coronary Artery Disease Brother     Deep Vein Thrombosis Brother     Deep Vein Thrombosis Brother     Coronary Artery Disease Brother     Deep Vein Thrombosis Brother     Cancer Brother         Throat cancer    Coronary Artery Disease Brother      Social History     Tobacco Use    Smoking status: Former Smoker    Smokeless tobacco: Never Used   Substance Use Topics    Alcohol use: Yes     Frequency: Never     Comment: OCASSIONALLY    Drug use: No       Allergies     Allergies   Allergen Reactions    Lipitor [Atorvastatin] Myalgia

## 2021-06-14 ENCOUNTER — OFFICE VISIT (OUTPATIENT)
Dept: FAMILY MEDICINE CLINIC | Age: 81
End: 2021-06-14
Payer: MEDICARE

## 2021-06-14 VITALS
DIASTOLIC BLOOD PRESSURE: 74 MMHG | BODY MASS INDEX: 28.15 KG/M2 | SYSTOLIC BLOOD PRESSURE: 169 MMHG | OXYGEN SATURATION: 97 % | HEIGHT: 72 IN | HEART RATE: 67 BPM | WEIGHT: 207.8 LBS | TEMPERATURE: 98.4 F | RESPIRATION RATE: 16 BRPM

## 2021-06-14 DIAGNOSIS — I10 ESSENTIAL HYPERTENSION: Primary | ICD-10-CM

## 2021-06-14 DIAGNOSIS — C44.310 BASAL CELL CARCINOMA (BCC) OF SKIN OF FACE, UNSPECIFIED PART OF FACE: ICD-10-CM

## 2021-06-14 DIAGNOSIS — F43.23 REACTION, ADJUSTMENT, WITH ANXIOUS, DEPRESSED MOOD: ICD-10-CM

## 2021-06-14 PROCEDURE — G8427 DOCREV CUR MEDS BY ELIG CLIN: HCPCS | Performed by: FAMILY MEDICINE

## 2021-06-14 PROCEDURE — G8419 CALC BMI OUT NRM PARAM NOF/U: HCPCS | Performed by: FAMILY MEDICINE

## 2021-06-14 PROCEDURE — G8754 DIAS BP LESS 90: HCPCS | Performed by: FAMILY MEDICINE

## 2021-06-14 PROCEDURE — 99214 OFFICE O/P EST MOD 30 MIN: CPT | Performed by: FAMILY MEDICINE

## 2021-06-14 PROCEDURE — G8536 NO DOC ELDER MAL SCRN: HCPCS | Performed by: FAMILY MEDICINE

## 2021-06-14 PROCEDURE — G8753 SYS BP > OR = 140: HCPCS | Performed by: FAMILY MEDICINE

## 2021-06-14 PROCEDURE — G8510 SCR DEP NEG, NO PLAN REQD: HCPCS | Performed by: FAMILY MEDICINE

## 2021-06-14 PROCEDURE — 1101F PT FALLS ASSESS-DOCD LE1/YR: CPT | Performed by: FAMILY MEDICINE

## 2021-06-14 RX ORDER — LISINOPRIL AND HYDROCHLOROTHIAZIDE 20; 25 MG/1; MG/1
1 TABLET ORAL DAILY
Qty: 90 TABLET | Refills: 1 | Status: SHIPPED | OUTPATIENT
Start: 2021-06-14 | End: 2021-11-18

## 2021-06-14 RX ORDER — AMIODARONE HYDROCHLORIDE 200 MG/1
TABLET ORAL
COMMUNITY
Start: 2021-06-11 | End: 2022-07-06

## 2021-06-14 NOTE — PROGRESS NOTES
Identified pt with two pt identifiers(name and ). Reviewed record in preparation for visit and have obtained necessary documentation. Chief Complaint   Patient presents with    Hypertension        Health Maintenance Due   Topic    DTaP/Tdap/Td series (1 - Tdap)    Pneumococcal 65+ years (2 of 2 - PPSV23)       Coordination of Care Questionnaire:  :   1) Have you been to an emergency room, urgent care, or hospitalized since your last visit? If yes, where when, and reason for visit? No       2. Have seen or consulted any other health care provider since your last visit? If yes, where when, and reason for visit?    No

## 2021-06-14 NOTE — PROGRESS NOTES
Ekaterina Parra  80 y.o. male  1940  [id number]  [facility]  Progress Note     Encounter Date: 6/14/2021    Assessment and Plan:     Encounter Diagnoses     ICD-10-CM ICD-9-CM   1. Essential hypertension  I10 401.9   2. Basal cell carcinoma (BCC) of skin of face, unspecified part of face  C44.310 173.31   3. Reaction, adjustment, with anxious, depressed mood  F43.23 309.28       1. Essential hypertension  Add diuretic and switch to combined dose  - lisinopril-hydroCHLOROthiazide (PRINZIDE, ZESTORETIC) 20-25 mg per tablet; Take 1 Tablet by mouth daily. Dispense: 90 Tablet; Refill: 1    2. Basal cell carcinoma (BCC) of skin of face, unspecified part of face  Dr. Eliane Harris or Louis Amato      I have discussed the diagnosis with the patient and the intended plan as seen in the above orders. he has expressed understanding. The patient has received an after-visit summary and questions were answered concerning future plans. I have discussed medication side effects and warnings with the patient as well. Electronically Signed: Daniele Rucker MD    Current Medications after this visit     Current Outpatient Medications   Medication Sig    lisinopril-hydroCHLOROthiazide (PRINZIDE, ZESTORETIC) 20-25 mg per tablet Take 1 Tablet by mouth daily.  amiodarone (CORDARONE) 200 mg tablet     dilTIAZem ER (TIAZAC) 180 mg capsule     rosuvastatin (CRESTOR) 5 mg tablet     apixaban (ELIQUIS) 5 mg tablet Take 5 mg by mouth two (2) times a day.  ezetimibe (ZETIA) 10 mg tablet Take 10 mg by mouth daily.  diphenhydrAMINE (BENADRYL ALLERGY) 25 mg tablet Take 25 mg by mouth every six (6) hours as needed for Sleep. No current facility-administered medications for this visit.      Medications Discontinued During This Encounter   Medication Reason    lisinopriL (PRINIVIL, ZESTRIL) 10 mg tablet Alternate Therapy ~~~~~~~~~~~~~~~~~~~~~~~~~~~~~~~~~~~~~~~~~~~~~~~~~~~~~~~~~~~    Chief Complaint   Patient presents with    Hypertension       History provided by patient  History of Present Illness   Trev Tran is a 80 y.o. male who presents to clinic today for:  Hypertension    Hypertension  Still up at home  170ish when he checks  Now on lisinopril 20 mg per day  Also on diltiazem 180 once a day. Getting sleep apnea test tonight  Drinks one drink a night three nights a week. Moderate AS by ECHO in past.    Skin lesion   Bleeds if he shaves or pick at it   Left cheek in front of ear. Lots of stress past 12 months:  Loss of wife, brother, and brother in law  Takes care of son with [facility] he is doing ok  Daughter lives nearby. Health Maintenance  Will do at future visit  Health Maintenance Due   Topic Date Due    DTaP/Tdap/Td series (1 - Tdap) Never done    Pneumococcal 65+ years (2 of 2 - PPSV23) 07/19/2018     Review of Systems   Review of Systems   Constitutional: Negative for chills, fever and weight loss. Respiratory: Negative for cough and shortness of breath. Cardiovascular: Negative for chest pain and leg swelling. Gastrointestinal: Negative for blood in stool. Genitourinary: Negative for hematuria. Psychiatric/Behavioral: Negative. Vitals/Objective:     Vitals:    06/14/21 0939 06/14/21 0956   BP: (!) 173/74 (!) 169/74   Pulse: 67    Resp: 16    Temp: 98.4 °F (36.9 °C)    TempSrc: Temporal    SpO2: 97%    Weight: 207 lb 12.8 oz (94.3 kg)    Height: 6' (1.829 m)      Body mass index is 28.18 kg/m². Wt Readings from Last 3 Encounters:   06/14/21 207 lb 12.8 oz (94.3 kg)   05/17/21 209 lb 9.6 oz (95.1 kg)   11/18/20 213 lb 3.2 oz (96.7 kg)         Objective  Physical Exam  Vitals and nursing note reviewed. Constitutional:       Appearance: Normal appearance. He is not toxic-appearing. HENT:      Head: Normocephalic and atraumatic.    Cardiovascular:      Rate and Rhythm: Normal rate and regular rhythm. Heart sounds: Murmur heard. Crescendo decrescendo systolic murmur is present with a grade of 2/6. No gallop. Pulmonary:      Effort: Pulmonary effort is normal. No respiratory distress. Breath sounds: Normal breath sounds. No wheezing, rhonchi or rales. Musculoskeletal:      Cervical back: No muscular tenderness. Right lower le+ Pitting Edema present. Left lower le+ Pitting Edema present. Lymphadenopathy:      Cervical: No cervical adenopathy. Neurological:      Mental Status: He is alert. Psychiatric:         Mood and Affect: Mood normal.         Behavior: Behavior normal.         Thought Content: Thought content normal.         Judgment: Judgment normal.         Skin: likely BCC in front or left tragus. No results found for this or any previous visit (from the past 24 hour(s)). Disposition     Follow-up and Dispositions  ·   Return in about 1 month (around 2021) for Blood pressure follow up, Medication follow up. Future Appointments   Date Time Provider Louis Marlene   2021  9:20 AM Sabino Malik MD [facility] BS AMB       History   Patient's past medical, surgical and family histories were reviewed and updated.     Past Medical History:   Diagnosis Date    Aortic stenosis     Moderate by ECHO 2019    CAD (coronary artery disease)     DVT (deep venous thrombosis) (HCC)     GERD (gastroesophageal reflux disease)     HTN (hypertension)     Hyperlipidemia     Mild anemia     Pulmonary embolism ([address]      Past Surgical History:   Procedure Laterality Date    HX CORONARY ARTERY BYPASS GRAFT      2007 by Dr. Ye Emery at [facility]     Family History   Problem Relation Age of Onset   [place] Stroke Mother     Heart Attack Mother     Stroke Father     Heart Attack Father     Coronary Artery Disease Brother     Deep Vein Thrombosis Brother     Deep Vein Thrombosis Brother     Coronary Artery Disease Brother     Deep Vein Thrombosis Brother     Cancer Brother         Throat cancer    Coronary Artery Disease Brother      Social History     Tobacco Use    Smoking status: Former Smoker    Smokeless tobacco: Never Used   Vaping Use    Vaping Use: Never used   Substance Use Topics    Alcohol use: Yes     Comment: OCASSIONALLY    Drug use: No       Allergies     Allergies   Allergen Reactions    Lipitor [Atorvastatin] Myalgia

## 2021-07-12 ENCOUNTER — OFFICE VISIT (OUTPATIENT)
Dept: FAMILY MEDICINE CLINIC | Age: 81
End: 2021-07-12
Payer: MEDICARE

## 2021-07-12 VITALS
RESPIRATION RATE: 16 BRPM | OXYGEN SATURATION: 98 % | HEIGHT: 72 IN | BODY MASS INDEX: 28.04 KG/M2 | DIASTOLIC BLOOD PRESSURE: 69 MMHG | WEIGHT: 207 LBS | TEMPERATURE: 97.4 F | SYSTOLIC BLOOD PRESSURE: 136 MMHG | HEART RATE: 60 BPM

## 2021-07-12 DIAGNOSIS — I10 ESSENTIAL HYPERTENSION: Primary | ICD-10-CM

## 2021-07-12 PROCEDURE — G8536 NO DOC ELDER MAL SCRN: HCPCS | Performed by: FAMILY MEDICINE

## 2021-07-12 PROCEDURE — 1101F PT FALLS ASSESS-DOCD LE1/YR: CPT | Performed by: FAMILY MEDICINE

## 2021-07-12 PROCEDURE — G8419 CALC BMI OUT NRM PARAM NOF/U: HCPCS | Performed by: FAMILY MEDICINE

## 2021-07-12 PROCEDURE — G8752 SYS BP LESS 140: HCPCS | Performed by: FAMILY MEDICINE

## 2021-07-12 PROCEDURE — G8754 DIAS BP LESS 90: HCPCS | Performed by: FAMILY MEDICINE

## 2021-07-12 PROCEDURE — G8510 SCR DEP NEG, NO PLAN REQD: HCPCS | Performed by: FAMILY MEDICINE

## 2021-07-12 PROCEDURE — G8427 DOCREV CUR MEDS BY ELIG CLIN: HCPCS | Performed by: FAMILY MEDICINE

## 2021-07-12 PROCEDURE — 99213 OFFICE O/P EST LOW 20 MIN: CPT | Performed by: FAMILY MEDICINE

## 2021-07-12 NOTE — PROGRESS NOTES
Eli Harrington  80 y.o. male  1940  [id number]  [facility]  Progress Note     Encounter Date: 7/12/2021    Assessment and Plan:     Encounter Diagnoses     ICD-10-CM ICD-9-CM   1. Essential hypertension  I10 401.9       1. Essential hypertension  Now at goal and tolerating meds well  Be sure to take med list to next Cardiology appointments  FU 6 months. Seeing DERM for basal cell surgery. I have discussed the diagnosis with the patient and the intended plan as seen in the above orders. he has expressed understanding. The patient has received an after-visit summary and questions were answered concerning future plans. I have discussed medication side effects and warnings with the patient as well. Electronically Signed: Ghassan Pena MD    Current Medications after this visit     Current Outpatient Medications   Medication Sig    amiodarone (CORDARONE) 200 mg tablet     lisinopril-hydroCHLOROthiazide (PRINZIDE, ZESTORETIC) 20-25 mg per tablet Take 1 Tablet by mouth daily.  dilTIAZem ER (TIAZAC) 180 mg capsule     rosuvastatin (CRESTOR) 5 mg tablet     apixaban (ELIQUIS) 5 mg tablet Take 5 mg by mouth two (2) times a day.  ezetimibe (ZETIA) 10 mg tablet Take 10 mg by mouth daily.  diphenhydrAMINE (BENADRYL ALLERGY) 25 mg tablet Take 25 mg by mouth every six (6) hours as needed for Sleep. No current facility-administered medications for this visit. There are no discontinued medications.   ~~~~~~~~~~~~~~~~~~~~~~~~~~~~~~~~~~~~~~~~~~~~~~~~~~~~~~~~~~~    Chief Complaint   Patient presents with    Hypertension       History provided by patient  History of Present Illness   Eli Harrington is a 80 y.o. male who presents to clinic today for:  Hypertension    Hypertension  Now at goal here and at home  Taking meds without difficult  No chest pain or dyspnea    Health Maintenance  Will do at future visit  Health Maintenance Due   Topic Date Due    DTaP/Tdap/Td series (1 - Tdap) Never done    Pneumococcal 65+ years (2 of 2 - PPSV23) 07/19/2018     Review of Systems   Review of Systems   Respiratory: Negative for shortness of breath. Cardiovascular: Positive for leg swelling. Negative for chest pain. Psychiatric/Behavioral: Negative. Vitals/Objective:     Vitals:    07/12/21 0920   BP: 136/69   Pulse: 60   Resp: 16   Temp: 97.4 °F (36.3 °C)   TempSrc: Temporal   SpO2: 98%   Weight: 207 lb (93.9 kg)   Height: 6' (1.829 m)     Body mass index is 28.07 kg/m². Wt Readings from Last 3 Encounters:   07/12/21 207 lb (93.9 kg)   06/14/21 207 lb 12.8 oz (94.3 kg)   05/17/21 209 lb 9.6 oz (95.1 kg)         Objective  Physical Exam  Vitals and nursing note reviewed. Constitutional:       Appearance: Normal appearance. He is not toxic-appearing. HENT:      Head: Normocephalic and atraumatic. Cardiovascular:      Rate and Rhythm: Normal rate and regular rhythm. Heart sounds: Murmur heard. Systolic murmur is present with a grade of 2/6. No gallop. Comments: Trace edema  Pulmonary:      Effort: Pulmonary effort is normal. No respiratory distress. Breath sounds: Normal breath sounds. No wheezing, rhonchi or rales. Musculoskeletal:      Cervical back: No muscular tenderness. Lymphadenopathy:      Cervical: No cervical adenopathy. Neurological:      Mental Status: He is alert. Psychiatric:         Mood and Affect: Mood normal.         Behavior: Behavior normal.         Thought Content: Thought content normal.         Judgment: Judgment normal.           No results found for this or any previous visit (from the past 24 hour(s)). Disposition     Follow-up and Dispositions  ·   Return in about 6 months (around 1/12/2022) for Blood pressure follow up, Medication follow up. No future appointments. History   Patient's past medical, surgical and family histories were reviewed and updated.     Past Medical History:   Diagnosis Date    Aortic stenosis     Moderate by ECHO 2019    CAD (coronary artery disease)     DVT (deep venous thrombosis) (HCC)     GERD (gastroesophageal reflux disease)     HTN (hypertension)     Hyperlipidemia     Mild anemia     Pulmonary embolism (HCC)      Past Surgical History:   Procedure Laterality Date    HX CORONARY ARTERY BYPASS GRAFT      07/24/2007 by Dr. Doron Benoit at [facility]     Family History   Problem Relation Age of Onset    Stroke Mother     Heart Attack Mother     Stroke Father     Heart Attack Father     Coronary Artery Disease Brother     Deep Vein Thrombosis Brother     Deep Vein Thrombosis Brother     Coronary Artery Disease Brother     Deep Vein Thrombosis Brother     Cancer Brother         Throat cancer    Coronary Artery Disease Brother      Social History     Tobacco Use    Smoking status: Former Smoker    Smokeless tobacco: Never Used   Vaping Use    Vaping Use: Never used   Substance Use Topics    Alcohol use: Yes     Comment: OCASSIONALLY    Drug use: No       Allergies     Allergies   Allergen Reactions    Lipitor [Atorvastatin] Myalgia

## 2021-07-12 NOTE — PROGRESS NOTES
Zafar Roth is a 80 y.o. male      Chief Complaint   Patient presents with    Hypertension         1. Have you been to the ER, urgent care clinic since your last visit? Hospitalized since your last visit? No      2. Have you seen or consulted any other health care providers outside of the [address] [place] [address] since your last visit? Include any pap smears or colon screening.    Pulmonary

## 2022-03-20 PROBLEM — I26.92 SADDLE EMBOLUS OF PULMONARY ARTERY (HCC): Status: ACTIVE | Noted: 2019-09-19

## 2022-03-20 PROBLEM — I48.0 PAROXYSMAL ATRIAL FIBRILLATION (HCC): Status: ACTIVE | Noted: 2020-11-18

## 2022-03-20 PROBLEM — I35.0 NON-RHEUMATIC AORTIC STENOSIS: Status: ACTIVE | Noted: 2019-09-19

## 2022-07-06 ENCOUNTER — OFFICE VISIT (OUTPATIENT)
Dept: FAMILY MEDICINE CLINIC | Age: 82
End: 2022-07-06
Payer: MEDICARE

## 2022-07-06 VITALS
HEIGHT: 72 IN | HEART RATE: 67 BPM | OXYGEN SATURATION: 98 % | TEMPERATURE: 97.7 F | RESPIRATION RATE: 18 BRPM | BODY MASS INDEX: 28.44 KG/M2 | DIASTOLIC BLOOD PRESSURE: 70 MMHG | WEIGHT: 210 LBS | SYSTOLIC BLOOD PRESSURE: 152 MMHG

## 2022-07-06 DIAGNOSIS — I48.0 PAROXYSMAL ATRIAL FIBRILLATION (HCC): ICD-10-CM

## 2022-07-06 DIAGNOSIS — I35.0 NON-RHEUMATIC AORTIC STENOSIS: ICD-10-CM

## 2022-07-06 DIAGNOSIS — I26.92 SADDLE EMBOLUS OF PULMONARY ARTERY WITHOUT ACUTE COR PULMONALE, UNSPECIFIED CHRONICITY (HCC): ICD-10-CM

## 2022-07-06 DIAGNOSIS — Z00.00 MEDICARE ANNUAL WELLNESS VISIT, SUBSEQUENT: Primary | ICD-10-CM

## 2022-07-06 DIAGNOSIS — E78.2 MIXED HYPERLIPIDEMIA: ICD-10-CM

## 2022-07-06 DIAGNOSIS — I10 ESSENTIAL HYPERTENSION: ICD-10-CM

## 2022-07-06 DIAGNOSIS — Z79.01 CHRONIC ANTICOAGULATION: ICD-10-CM

## 2022-07-06 DIAGNOSIS — I25.810 CORONARY ARTERY DISEASE INVOLVING CORONARY BYPASS GRAFT OF NATIVE HEART WITHOUT ANGINA PECTORIS: ICD-10-CM

## 2022-07-06 PROBLEM — Z78.9 STATIN INTOLERANCE: Status: ACTIVE | Noted: 2022-07-06

## 2022-07-06 PROCEDURE — G8754 DIAS BP LESS 90: HCPCS | Performed by: FAMILY MEDICINE

## 2022-07-06 PROCEDURE — G8417 CALC BMI ABV UP PARAM F/U: HCPCS | Performed by: FAMILY MEDICINE

## 2022-07-06 PROCEDURE — G0439 PPPS, SUBSEQ VISIT: HCPCS | Performed by: FAMILY MEDICINE

## 2022-07-06 PROCEDURE — 99214 OFFICE O/P EST MOD 30 MIN: CPT | Performed by: FAMILY MEDICINE

## 2022-07-06 PROCEDURE — 1101F PT FALLS ASSESS-DOCD LE1/YR: CPT | Performed by: FAMILY MEDICINE

## 2022-07-06 PROCEDURE — 1123F ACP DISCUSS/DSCN MKR DOCD: CPT | Performed by: FAMILY MEDICINE

## 2022-07-06 PROCEDURE — G8427 DOCREV CUR MEDS BY ELIG CLIN: HCPCS | Performed by: FAMILY MEDICINE

## 2022-07-06 PROCEDURE — G8753 SYS BP > OR = 140: HCPCS | Performed by: FAMILY MEDICINE

## 2022-07-06 PROCEDURE — G8510 SCR DEP NEG, NO PLAN REQD: HCPCS | Performed by: FAMILY MEDICINE

## 2022-07-06 PROCEDURE — G8536 NO DOC ELDER MAL SCRN: HCPCS | Performed by: FAMILY MEDICINE

## 2022-07-06 RX ORDER — LISINOPRIL AND HYDROCHLOROTHIAZIDE 20; 25 MG/1; MG/1
TABLET ORAL
Qty: 90 TABLET | Refills: 1 | Status: SHIPPED | OUTPATIENT
Start: 2022-07-06

## 2022-07-06 NOTE — PROGRESS NOTES
Chief Complaint   Patient presents with    Physical     1. Have you been to the ER, urgent care clinic since your last visit? Hospitalized since your last visit? No      2. Have you seen or consulted any other health care providers outside of the [address] [place] [address] since your last visit? Include any pap smears or colon screening.  No

## 2022-07-06 NOTE — PROGRESS NOTES
Annemarie Doty  80 y.o. male  1940  UWT:[id number]  [facility] MEDICINE  Progress Note     Encounter Date: 7/6/2022    Assessment and Plan:     Encounter Diagnoses     ICD-10-CM ICD-9-CM   1. Medicare annual wellness visit, subsequent  Z00.00 V70.0   2. Saddle embolus of pulmonary artery without acute cor pulmonale, unspecified chronicity (HCC)  I26.92 415.13   3. Paroxysmal atrial fibrillation (HCC)  I48.0 427.31   4. Non-rheumatic aortic stenosis  I35.0 424.1   5. Coronary artery disease involving coronary bypass graft of native heart without angina pectoris  I25.810 414.05   6. Mixed hyperlipidemia  E78.2 272.2   7. Essential hypertension  I10 401.9   8. Chronic anticoagulation  Z79.01 V58.61       1. Medicare annual wellness visit, subsequent  updated  - diph,pertuss,acel,,tetanus vac,PF, (ADACEL) 2 Lf-(2.5-5-3-5 mcg)-5Lf/0.5 mL syrg vaccine; 0.5 mL by IntraMUSCular route once for 1 dose. Dispense: 0.5 mL; Refill: 0  - pneumococcal 23-mae ps vaccine (PNEUMOVAX 23) 25 mcg/0.5 mL injection; 0.5 mL by IntraMUSCular route once for 1 dose. Dispense: 0.5 mL; Refill: 0    2. Saddle embolus of pulmonary artery without acute cor pulmonale, unspecified chronicity (HCC)  Sees pulmonary for Eliquis  No blood in urine or stool    3. Paroxysmal atrial fibrillation (HCC)  Rate controlled, on Eliquis  Sees Cardiology    4. Non-rheumatic aortic stenosis  Needs to FU with cardiology, loud systolic murmur    5. Coronary artery disease involving coronary bypass graft of native heart without angina pectoris  No current chest pain or dyspnea    6. Mixed hyperlipidemia  zetia only, intolerant of statin drugs    - HEPATIC FUNCTION PANEL; Future  - LIPID PANEL; Future    7.  Essential hypertension  Not at goal  Continue meds  FU with Cardiology for additional changes  - MICROALBUMIN, UR, RAND W/ MICROALB/CREAT RATIO; Future  - METABOLIC PANEL, BASIC; Future  - lisinopril-hydroCHLOROthiazide (PRINZIDE, ZESTORETIC) 20-25 mg per tablet; TAKE 1 TABLET DAILY by mouth  Dispense: 90 Tablet; Refill: 1    8. Chronic anticoagulation  Check CBC,  - CBC WITH AUTOMATED DIFF; Future      I have discussed the diagnosis with the patient and the intended plan as seen in the above orders. he has expressed understanding. The patient has received an after-visit summary and questions were answered concerning future plans. I have discussed medication side effects and warnings with the patient as well. Electronically Signed: Brian Hu MD    Current Medications after this visit     Current Outpatient Medications   Medication Sig    diph,pertuss,acel,,tetanus vac,PF, (ADACEL) 2 Lf-(2.5-5-3-5 mcg)-5Lf/0.5 mL syrg vaccine 0.5 mL by IntraMUSCular route once for 1 dose.  pneumococcal 23-mae ps vaccine (PNEUMOVAX 23) 25 mcg/0.5 mL injection 0.5 mL by IntraMUSCular route once for 1 dose.  lisinopril-hydroCHLOROthiazide (PRINZIDE, ZESTORETIC) 20-25 mg per tablet TAKE 1 TABLET DAILY by mouth    dilTIAZem ER (TIAZAC) 180 mg capsule     apixaban (ELIQUIS) 5 mg tablet Take 5 mg by mouth two (2) times a day.  ezetimibe (ZETIA) 10 mg tablet Take 10 mg by mouth daily.  diphenhydrAMINE (BENADRYL ALLERGY) 25 mg tablet Take 25 mg by mouth every six (6) hours as needed for Sleep. No current facility-administered medications for this visit.      Medications Discontinued During This Encounter   Medication Reason    amiodarone (CORDARONE) 200 mg tablet Not A Current Medication    rosuvastatin (CRESTOR) 5 mg tablet Not A Current Medication    lisinopril-hydroCHLOROthiazide (PRINZIDE, ZESTORETIC) 20-25 mg per tablet REORDER     ~~~~~~~~~~~~~~~~~~~~~~~~~~~~~~~~~~~~~~~~~~~~~~~~~~~~~~~~~~~    Chief Complaint   Patient presents with    Physical       History provided by patient  History of Present Illness   Mehran Isaacs is a 80 y.o. male who presents to clinic today for:  Physical    Atrial fib  Remains on dilt and Kimberleekumar  Now sees Dr. Sebas Lutz at [facility]    Hypertension  Up today  No other recent BP checks  Consistent with meds    Hypercholesterolemia  On zetia only  Intolerant of lipitor and crestor        Health Maintenance  Completed HM gaps at today's visit  Health Maintenance Due   Topic Date Due    DTaP/Tdap/Td series (1 - Tdap) Never done    Pneumococcal 65+ years (2 - PPSV23 or PCV20) 07/19/2018    COVID-19 Vaccine (3 - Booster for Moderna series) 10/07/2021    Depression Screen  07/12/2022     Review of Systems   Review of Systems   Respiratory: Negative for shortness of breath. Cardiovascular: Negative for chest pain, palpitations and leg swelling. Gastrointestinal: Negative for blood in stool. Genitourinary: Negative for hematuria. Musculoskeletal: Positive for joint pain. Psychiatric/Behavioral: Negative. Vitals/Objective:     Vitals:    07/06/22 1525 07/06/22 1526 07/06/22 1702   BP: (!) 145/68 (!) 149/70 (!) 152/70   Pulse: 67     Resp: 18     Temp: 97.7 °F (36.5 °C)     TempSrc: Temporal     SpO2: 98%     Weight: 210 lb (95.3 kg)     Height: 6' (1.829 m)       Body mass index is 28.48 kg/m². Wt Readings from Last 3 Encounters:   07/06/22 210 lb (95.3 kg)   07/12/21 207 lb (93.9 kg)   06/14/21 207 lb 12.8 oz (94.3 kg)         Objective  Physical Exam  Vitals and nursing note reviewed. Constitutional:       Appearance: Normal appearance. He is not toxic-appearing. HENT:      Head: Normocephalic and atraumatic. Cardiovascular:      Rate and Rhythm: Normal rate and regular rhythm. Heart sounds: Murmur (outflow track) heard. Systolic murmur is present with a grade of 3/6. No gallop. Pulmonary:      Effort: Pulmonary effort is normal. No respiratory distress. Breath sounds: Normal breath sounds. No wheezing, rhonchi or rales. Musculoskeletal:      Cervical back: No muscular tenderness. Right lower leg: No edema. Left lower leg: No edema.    Lymphadenopathy: Cervical: No cervical adenopathy. Neurological:      Mental Status: He is alert. Psychiatric:         Mood and Affect: Mood normal.         Behavior: Behavior normal.         Thought Content: Thought content normal.         Judgment: Judgment normal.           No results found for this or any previous visit (from the past 24 hour(s)). Disposition     Follow-up and Dispositions  ·   Return in about 6 months (around 1/6/2023) for Blood pressure follow up. No future appointments. History   Patient's past medical, surgical and family histories were reviewed and updated.     Past Medical History:   Diagnosis Date    Aortic stenosis     Moderate by ECHO 2019    CAD (coronary artery disease)     DVT (deep venous thrombosis) (HCC)     GERD (gastroesophageal reflux disease)     HTN (hypertension)     Hyperlipidemia     Mild anemia     Pulmonary embolism (HCC)      Past Surgical History:   Procedure Laterality Date    HX CORONARY ARTERY BYPASS GRAFT      07/24/2007 by [facility] Natacha [facility]     Family History   Problem Relation Age of Onset    Stroke Mother     Heart Attack Mother     Stroke Father     Heart Attack Father     Coronary Art Dis Brother     Deep Vein Thrombosis Brother     Deep Vein Thrombosis Brother     Coronary Art Dis Brother     Deep Vein Thrombosis Brother     Cancer Brother         Throat cancer    Coronary Art Dis Brother      Social History     Tobacco Use    Smoking status: Former Smoker    Smokeless tobacco: Never Used   Vaping Use    Vaping Use: Never used   Substance Use Topics    Alcohol use: Yes     Comment: OCASSIONALLY    Drug use: No       Allergies     Allergies   Allergen Reactions    Lipitor [Atorvastatin] Myalgia                     This is the Subsequent Medicare Annual Wellness Exam, performed 12 months or more after the Initial AWV or the last Subsequent AWV    I have reviewed the patient's medical history in detail and updated the computerized patient record. Assessment/Plan   Education and counseling provided:  Are appropriate based on today's review and evaluation    1. Medicare annual wellness visit, subsequent  -     diph,pertuss,acel,,tetanus vac,PF, (ADACEL) 2 Lf-(2.5-5-3-5 mcg)-5Lf/0.5 mL syrg vaccine; 0.5 mL by IntraMUSCular route once for 1 dose., Print, Disp-0.5 mL, R-0  -     pneumococcal 23-mae ps vaccine (PNEUMOVAX 23) 25 mcg/0.5 mL injection; 0.5 mL by IntraMUSCular route once for 1 dose., Print, Disp-0.5 mL, R-0  2. Saddle embolus of pulmonary artery without acute cor pulmonale, unspecified chronicity (HCC)  3. Paroxysmal atrial fibrillation ([facility] [address]  4. Non-rheumatic aortic stenosis  5. Coronary artery disease involving coronary bypass graft of native heart without angina pectoris  6. Mixed hyperlipidemia  -     HEPATIC FUNCTION PANEL; Future  -     LIPID PANEL; Future  7. Essential hypertension  -     MICROALBUMIN, UR, RAND W/ MICROALB/CREAT RATIO; Future  -     METABOLIC PANEL, BASIC; Future  -     lisinopril-hydroCHLOROthiazide (PRINZIDE, ZESTORETIC) 20-25 mg per tablet; TAKE 1 TABLET DAILY by mouth, Normal, Disp-90 Tablet, R-1  8. Chronic anticoagulation  -     CBC WITH AUTOMATED DIFF; Future       Depression Risk Factor Screening     3 most recent PHQ Screens 7/6/2022   Little interest or pleasure in doing things Not at all   Feeling down, depressed, irritable, or hopeless Not at all   Total Score PHQ 2 0       Alcohol & Drug Abuse Risk Screen    Do you average more than 1 drink per night or more than 7 drinks a week: No    In the past three months have you have had more than 4 drinks containing alcohol on one occasion: No    Non smoker, past smoker        Functional Ability and Level of Safety    Hearing: Hearing is good. Vision up to date   Activities of Daily Living: The home contains: no safety equipment.   Patient does total self care      Ambulation: with mild difficulty knee pain and breathing Fall Risk:  Fall Risk Assessment, last 12 mths 7/12/2021   Able to walk? Yes   Fall in past 12 months? 0   Do you feel unsteady?  0   Are you worried about falling 0      Abuse Screen:  Patient is not abused       Cognitive Screening    Has your family/caregiver stated any concerns about your memory: no     Cognitive Screening: Normal - interview    Health Maintenance Due     Health Maintenance Due   Topic Date Due    DTaP/Tdap/Td series (1 - Tdap) Never done    Pneumococcal 65+ years (2 - PPSV23 or PCV20) 07/19/2018    COVID-19 Vaccine (3 - Booster for [employer] series) 10/07/2021    Depression Screen  07/12/2022       Patient Care Team   Patient Care Team:  Orlando Batres MD as PCP - General (Family Medicine)  Orlando Batres MD as PCP - [facility]  Ava Rodriguez DO (Pulmonary Disease)  Tootie Eckert MD (Cardiovascular Disease Physician)    History     Patient Active Problem List   Diagnosis Code    Hyperlipidemia E78.5    HTN (hypertension) I10    GERD (gastroesophageal reflux disease) K21.9    CAD (coronary artery disease) I25.10    Pulmonary embolism ([address] I26.99    DVT (deep venous thrombosis) ([address] I82.409    Saddle embolus of pulmonary artery ([address] I26.92    Non-rheumatic aortic stenosis I35.0    Paroxysmal atrial fibrillation ([address] I48.0    Statin intolerance Z78.9     Past Medical History:   Diagnosis Date    Aortic stenosis     Moderate by ECHO 2019    CAD (coronary artery disease)     DVT (deep venous thrombosis) ([address]     GERD (gastroesophageal reflux disease)     HTN (hypertension)     Hyperlipidemia     Mild anemia     Pulmonary embolism ([address]       Past Surgical History:   Procedure Laterality Date    HX CORONARY ARTERY BYPASS GRAFT      07/24/2007 by Dr. Alejandro Myles at [facility]     Current Outpatient Medications   Medication Sig Dispense Refill    diph,pertuss,acel,,tetanus vac,PF, (ADACEL) 2 Lf-(2.5-5-3-5 mcg)-5Lf/0.5 mL syrg vaccine 0.5 mL by IntraMUSCular route once for 1 dose. 0.5 mL 0    pneumococcal 23-mae ps vaccine (PNEUMOVAX 23) 25 mcg/0.5 mL injection 0.5 mL by IntraMUSCular route once for 1 dose. 0.5 mL 0    lisinopril-hydroCHLOROthiazide (PRINZIDE, ZESTORETIC) 20-25 mg per tablet TAKE 1 TABLET DAILY by mouth 90 Tablet 1    dilTIAZem ER (TIAZAC) 180 mg capsule       apixaban (ELIQUIS) 5 mg tablet Take 5 mg by mouth two (2) times a day.  ezetimibe (ZETIA) 10 mg tablet Take 10 mg by mouth daily.  diphenhydrAMINE (BENADRYL ALLERGY) 25 mg tablet Take 25 mg by mouth every six (6) hours as needed for Sleep.        Allergies   Allergen Reactions    Lipitor [Atorvastatin] Myalgia       Family History   Problem Relation Age of Onset    Stroke Mother     Heart Attack Mother     Stroke Father     Heart Attack Father     Coronary Art Dis Brother     Deep Vein Thrombosis Brother     Deep Vein Thrombosis Brother     Coronary Art Dis Brother     Deep Vein Thrombosis Brother     Cancer Brother         Throat cancer    Coronary Art Dis Brother      Social History     Tobacco Use    Smoking status: Former Smoker    Smokeless tobacco: Never Used   Substance Use Topics    Alcohol use: Yes     Comment: Amy Mercado MD

## 2022-07-06 NOTE — PATIENT INSTRUCTIONS
Medicare Wellness Visit, Male    The best way to live healthy is to have a lifestyle where you eat a well-balanced diet, exercise regularly, limit alcohol use, and quit all forms of tobacco/nicotine, if applicable. Regular preventive services are another way to keep healthy. Preventive services (vaccines, screening tests, monitoring & exams) can help personalize your care plan, which helps you manage your own care. Screening tests can find health problems at the earliest stages, when they are easiest to treat. [facility] follows the current, evidence-based guidelines published by the [facility] ([facility]) when recommending preventive services for our patients. Because we follow these guidelines, sometimes recommendations change over time as research supports it. (For example, a prostate screening blood test is no longer routinely recommended for men with no symptoms). Of course, you and your doctor may decide to screen more often for some diseases, based on your risk and co-morbidities (chronic disease you are already diagnosed with). Preventive services for you include:  - Medicare offers their members a free annual wellness visit, which is time for you and your primary care provider to discuss and plan for your preventive service needs. Take advantage of this benefit every year!  -All adults over age 72 should receive the recommended pneumonia vaccines. Current USPSTF guidelines recommend a series of two vaccines for the best pneumonia protection.   -All adults should have a flu vaccine yearly and tetanus vaccine every 10 years.  -All adults age 48 and older should receive the shingles vaccines (series of two vaccines).        -All adults age 38-68 who are overweight should have a diabetes screening test once every three years.   -Other screening tests & preventive services for persons with diabetes include: an eye exam to screen for diabetic retinopathy, a kidney function test, a foot exam, and stricter control over your cholesterol.   -Cardiovascular screening for adults with routine risk involves an electrocardiogram (ECG) at intervals determined by the provider.   -Colorectal cancer screening should be done for adults age 54-65 with no increased risk factors for colorectal cancer. There are a number of acceptable methods of screening for this type of cancer. Each test has its own benefits and drawbacks. Discuss with your provider what is most appropriate for you during your annual wellness visit. The different tests include: colonoscopy (considered the best screening method), a fecal occult blood test, a fecal DNA test, and sigmoidoscopy.  -All adults born between [facility] should be screened once for Hepatitis C.  -An Abdominal Aortic Aneurysm (AAA) Screening is recommended for men age 73-68 who has ever smoked in their lifetime. Here is a list of your current Health Maintenance items (your personalized list of preventive services) with a due date:  Health Maintenance Due   Topic Date Due    DTaP/Tdap/Td  (1 - Tdap) Never done    Pneumococcal Vaccine (2 - PPSV23 or PCV20) 07/19/2018    COVID-19 Vaccine (3 - Booster for [employer] series) 10/07/2021    Depresssion Screening  07/12/2022     The goal blood pressure for most patients is 140/90. The whole point of treating blood pressure is to prevent strokes, heart attacks and kidney damage. Persistently elevated blood pressure damages blood vessels and can lead to catastrophic heart problems and strokes. Recommendations for Hypertension (elevated blood pressure):    · Purchase a blood pressure cuff that goes around your upper arm and check blood pressure at least 3 times per week. Write down your numbers for review. Check blood pressure in the morning and evening.  Rest for 5 minutes with feet elevated and arm resting on a table (at mid-chest level) when checking blood pressure · Exercise 30-60 minutes most days of the week, preferably with a mix of cardiovascular and strength training. Exercise can improve blood pressure, even if you do not lose weight! · Limit alcohol intake to less than 3-4 drinks per week. · Avoid tobacco products    · Avoid illegal drugs especially amphetamines  · DASH diet - includes fruits, vegetables, fiber, and less than 2000 mg sodium (salt) daily. · Try to eat a low sugar diet. Sugar worsens diabetes and activates kidney hormones that raise blood pressure.    · Avoid non-steroidal inflammatory medications (NSAIDS) such as ibuprofen, advil, motrin, aleve, and naproxyn as these may increase blood pressure if used long-term    · Avoid OTC decongestants such as pseudopherine, phenylephrine, Afrin  · Take your blood pressure medicine (and aspirin if prescribed) religiously

## 2022-07-11 ENCOUNTER — LAB ONLY (OUTPATIENT)
Dept: FAMILY MEDICINE CLINIC | Age: 82
End: 2022-07-11

## 2022-07-11 DIAGNOSIS — E78.2 MIXED HYPERLIPIDEMIA: ICD-10-CM

## 2022-07-11 DIAGNOSIS — Z79.01 CHRONIC ANTICOAGULATION: ICD-10-CM

## 2022-07-11 DIAGNOSIS — I10 ESSENTIAL HYPERTENSION: ICD-10-CM

## 2022-07-12 LAB
ALBUMIN SERPL-MCNC: 3.8 G/DL (ref 3.5–5)
ALBUMIN/GLOB SERPL: 1.1 {RATIO} (ref 1.1–2.2)
ALP SERPL-CCNC: 42 U/L (ref 45–117)
ALT SERPL-CCNC: 45 U/L (ref 12–78)
ANION GAP SERPL CALC-SCNC: 9 MMOL/L (ref 5–15)
AST SERPL-CCNC: 35 U/L (ref 15–37)
BASOPHILS # BLD: 0 K/UL (ref 0–0.1)
BASOPHILS NFR BLD: 1 % (ref 0–1)
BILIRUB DIRECT SERPL-MCNC: 0.2 MG/DL (ref 0–0.2)
BILIRUB SERPL-MCNC: 0.5 MG/DL (ref 0.2–1)
BUN SERPL-MCNC: 29 MG/DL (ref 6–20)
BUN/CREAT SERPL: 21 (ref 12–20)
CALCIUM SERPL-MCNC: 10.4 MG/DL (ref 8.5–10.1)
CHLORIDE SERPL-SCNC: 105 MMOL/L (ref 97–108)
CHOLEST SERPL-MCNC: 171 MG/DL
CO2 SERPL-SCNC: 24 MMOL/L (ref 21–32)
CREAT SERPL-MCNC: 1.35 MG/DL (ref 0.7–1.3)
CREAT UR-MCNC: 136 MG/DL
DIFFERENTIAL METHOD BLD: ABNORMAL
EOSINOPHIL # BLD: 0.1 K/UL (ref 0–0.4)
EOSINOPHIL NFR BLD: 1 % (ref 0–7)
ERYTHROCYTE [DISTWIDTH] IN BLOOD BY AUTOMATED COUNT: 13.7 % (ref 11.5–14.5)
GLOBULIN SER CALC-MCNC: 3.4 G/DL (ref 2–4)
GLUCOSE SERPL-MCNC: 137 MG/DL (ref 65–100)
HCT VFR BLD AUTO: 41.7 % (ref 36.6–50.3)
HDLC SERPL-MCNC: 67 MG/DL
HDLC SERPL: 2.6 {RATIO} (ref 0–5)
HGB BLD-MCNC: 13.6 G/DL (ref 12.1–17)
IMM GRANULOCYTES # BLD AUTO: 0 K/UL (ref 0–0.04)
IMM GRANULOCYTES NFR BLD AUTO: 1 % (ref 0–0.5)
LDLC SERPL CALC-MCNC: 72.2 MG/DL (ref 0–100)
LYMPHOCYTES # BLD: 2 K/UL (ref 0.8–3.5)
LYMPHOCYTES NFR BLD: 30 % (ref 12–49)
MCH RBC QN AUTO: 34.9 PG (ref 26–34)
MCHC RBC AUTO-ENTMCNC: 32.6 G/DL (ref 30–36.5)
MCV RBC AUTO: 106.9 FL (ref 80–99)
MICROALBUMIN UR-MCNC: 2.05 MG/DL
MICROALBUMIN/CREAT UR-RTO: 15 MG/G (ref 0–30)
MONOCYTES # BLD: 0.6 K/UL (ref 0–1)
MONOCYTES NFR BLD: 8 % (ref 5–13)
NEUTS SEG # BLD: 3.9 K/UL (ref 1.8–8)
NEUTS SEG NFR BLD: 59 % (ref 32–75)
NRBC # BLD: 0 K/UL (ref 0–0.01)
NRBC BLD-RTO: 0 PER 100 WBC
PLATELET # BLD AUTO: 154 K/UL (ref 150–400)
PMV BLD AUTO: 12.8 FL (ref 8.9–12.9)
POTASSIUM SERPL-SCNC: 4.4 MMOL/L (ref 3.5–5.1)
PROT SERPL-MCNC: 7.2 G/DL (ref 6.4–8.2)
RBC # BLD AUTO: 3.9 M/UL (ref 4.1–5.7)
SODIUM SERPL-SCNC: 138 MMOL/L (ref 136–145)
TRIGL SERPL-MCNC: 159 MG/DL (ref ?–150)
VLDLC SERPL CALC-MCNC: 31.8 MG/DL
WBC # BLD AUTO: 6.5 K/UL (ref 4.1–11.1)

## 2022-07-14 DIAGNOSIS — D51.9 ANEMIA DUE TO VITAMIN B12 DEFICIENCY, UNSPECIFIED B12 DEFICIENCY TYPE: Primary | ICD-10-CM

## 2022-07-14 NOTE — PROGRESS NOTES
Call patient. His cholesterol and other blood work is OK except for having large red blood cells. I would like for him to come in for additional labs work to check his Vitamin B12 level which can cause this. He can come straight to the lab and it will be ordered for him to have that blood work. I'll be in touch with the results.   [facility]

## 2022-07-18 ENCOUNTER — TELEPHONE (OUTPATIENT)
Dept: FAMILY MEDICINE CLINIC | Age: 82
End: 2022-07-18

## 2022-07-18 NOTE — TELEPHONE ENCOUNTER
----- Message from Aminata Alvarez MD sent at 7/14/2022  3:10 PM EDT -----  Call patient. His cholesterol and other blood work is OK except for having large red blood cells. I would like for him to come in for additional labs work to check his Vitamin B12 level which can cause this. He can come straight to the lab and it will be ordered for him to have that blood work. I'll be in touch with the results.   [facility]

## 2022-07-19 NOTE — TELEPHONE ENCOUNTER
----- Message from Flory Manjarrez MD sent at 7/14/2022  3:10 PM EDT -----  Call patient. His cholesterol and other blood work is OK except for having large red blood cells. I would like for him to come in for additional labs work to check his Vitamin B12 level which can cause this. He can come straight to the lab and it will be ordered for him to have that blood work. I'll be in touch with the results.   [facility]

## 2023-01-19 DIAGNOSIS — I10 ESSENTIAL HYPERTENSION: ICD-10-CM

## 2023-01-19 RX ORDER — LISINOPRIL AND HYDROCHLOROTHIAZIDE 20; 25 MG/1; MG/1
TABLET ORAL
Qty: 90 TABLET | Refills: 3 | Status: SHIPPED | OUTPATIENT
Start: 2023-01-19

## 2023-02-22 ENCOUNTER — OFFICE VISIT (OUTPATIENT)
Dept: FAMILY MEDICINE CLINIC | Age: 83
End: 2023-02-22
Payer: MEDICARE

## 2023-02-22 VITALS
BODY MASS INDEX: 27.66 KG/M2 | HEIGHT: 72 IN | SYSTOLIC BLOOD PRESSURE: 138 MMHG | WEIGHT: 204.2 LBS | DIASTOLIC BLOOD PRESSURE: 67 MMHG | RESPIRATION RATE: 20 BRPM | TEMPERATURE: 97.4 F | HEART RATE: 64 BPM | OXYGEN SATURATION: 98 %

## 2023-02-22 DIAGNOSIS — I48.0 PAROXYSMAL ATRIAL FIBRILLATION (HCC): ICD-10-CM

## 2023-02-22 DIAGNOSIS — I10 ESSENTIAL HYPERTENSION: Primary | ICD-10-CM

## 2023-02-22 DIAGNOSIS — I35.0 NON-RHEUMATIC AORTIC STENOSIS: ICD-10-CM

## 2023-02-22 DIAGNOSIS — E78.2 MIXED HYPERLIPIDEMIA: ICD-10-CM

## 2023-02-22 DIAGNOSIS — I26.92 SADDLE EMBOLUS OF PULMONARY ARTERY WITHOUT ACUTE COR PULMONALE, UNSPECIFIED CHRONICITY (HCC): ICD-10-CM

## 2023-02-22 PROCEDURE — 99213 OFFICE O/P EST LOW 20 MIN: CPT | Performed by: FAMILY MEDICINE

## 2023-02-22 PROCEDURE — G8417 CALC BMI ABV UP PARAM F/U: HCPCS | Performed by: FAMILY MEDICINE

## 2023-02-22 PROCEDURE — 3078F DIAST BP <80 MM HG: CPT | Performed by: FAMILY MEDICINE

## 2023-02-22 PROCEDURE — G8510 SCR DEP NEG, NO PLAN REQD: HCPCS | Performed by: FAMILY MEDICINE

## 2023-02-22 PROCEDURE — 3075F SYST BP GE 130 - 139MM HG: CPT | Performed by: FAMILY MEDICINE

## 2023-02-22 PROCEDURE — 1101F PT FALLS ASSESS-DOCD LE1/YR: CPT | Performed by: FAMILY MEDICINE

## 2023-02-22 PROCEDURE — G8427 DOCREV CUR MEDS BY ELIG CLIN: HCPCS | Performed by: FAMILY MEDICINE

## 2023-02-22 PROCEDURE — G8536 NO DOC ELDER MAL SCRN: HCPCS | Performed by: FAMILY MEDICINE

## 2023-02-22 PROCEDURE — 1123F ACP DISCUSS/DSCN MKR DOCD: CPT | Performed by: FAMILY MEDICINE

## 2023-02-22 RX ORDER — ROSUVASTATIN CALCIUM 5 MG/1
TABLET, COATED ORAL
COMMUNITY
Start: 2022-12-30 | End: 2023-02-22

## 2023-02-22 NOTE — PROGRESS NOTES
Identified pt with two pt identifiers(name and ). Chief Complaint   Patient presents with    Medication Evaluation     Follow up        Health Maintenance Due   Topic    DTaP/Tdap/Td series (1 - Tdap)    Pneumococcal 65+ years (2 - PPSV23 if available, else PCV20)    COVID-19 Vaccine (3 - Booster for Moderna series)    Flu Vaccine (1)       Wt Readings from Last 3 Encounters:   23 204 lb 3.2 oz (92.6 kg)   22 210 lb (95.3 kg)   21 207 lb (93.9 kg)     Temp Readings from Last 3 Encounters:   23 97.4 °F (36.3 °C) (Temporal)   22 97.7 °F (36.5 °C) (Temporal)   21 97.4 °F (36.3 °C) (Temporal)     BP Readings from Last 3 Encounters:   23 (!) 143/69   22 (!) 152/70   21 136/69     Pulse Readings from Last 3 Encounters:   23 64   22 67   21 60         Learning Assessment:  :     No flowsheet data found. Depression Screening:  :     3 most recent PHQ Screens 2023   Little interest or pleasure in doing things Not at all   Feeling down, depressed, irritable, or hopeless Not at all   Total Score PHQ 2 0       Fall Risk Assessment:  :     Fall Risk Assessment, last 12 mths 2023   Able to walk? Yes   Fall in past 12 months? 0   Do you feel unsteady? 0   Are you worried about falling 0       Abuse Screening:  :     Abuse Screening Questionnaire 2020   Do you ever feel afraid of your partner? N N   Are you in a relationship with someone who physically or mentally threatens you? N N   Is it safe for you to go home? Y Y       Coordination of Care Questionnaire:  :   1. \"Have you been to the ER, urgent care clinic since your last visit? Hospitalized since your last visit? \" Yes Where: [facility]  Cath     [facility]    2. \"Have you seen or consulted any other health care providers outside of the [address] [place] [address] since your last visit? \" Yes See above      3.  For patients aged 45-75: Has the patient had a colonoscopy / FIT/ Cologuard? No      If the patient is female:    4. For patients aged 41-77: Has the patient had a mammogram within the past 2 years? No      5. For patients aged 21-65: Has the patient had a pap smear? No     3) Do you have an Advance Directive on file? no  Are you interested in receiving information about Advance Directives? no    Patient is accompanied by self I have received verbal consent from Susana Maldonado to discuss any/all medical information while they are present in the room.

## 2023-02-22 NOTE — PROGRESS NOTES
Kodak Yousif  80 y.o. male  1940  [facility]:[id number]  [facility]  Progress Note     Encounter Date: 2/22/2023    Assessment and Plan:     Encounter Diagnoses     ICD-10-CM ICD-9-CM   1. Essential hypertension  I10 401.9   2. Mixed hyperlipidemia  E78.2 272.2   3. Non-rheumatic aortic stenosis  I35.0 424.1   4. Saddle embolus of pulmonary artery without acute cor pulmonale, unspecified chronicity (HCC)  I26.92 415.13   5. Paroxysmal atrial fibrillation (HCC)  I48.0 427.31       1. Essential hypertension  At goal, continue meds  No labs today. Will get them with upcoming procedure. 2. Mixed hyperlipidemia  Intolerant of statins, zetia only    3. Non-rheumatic aortic stenosis  Preparing for replacement    4. Saddle embolus of pulmonary artery without acute cor pulmonale, unspecified chronicity (HCC)  Remains on chronic anticoagulation    5. Paroxysmal atrial fibrillation (HCC)  Sounds like he is in sinus currently      I have discussed the diagnosis with the patient and the intended plan as seen in the above orders. he has expressed understanding. The patient has received an after-visit summary and questions were answered concerning future plans. I have discussed medication side effects and warnings with the patient as well. Electronically Signed: Devora Mary MD    Current Medications after this visit     Current Outpatient Medications   Medication Sig    lisinopril-hydroCHLOROthiazide (PRINZIDE, ZESTORETIC) 20-25 mg per tablet TAKE 1 TABLET DAILY    dilTIAZem ER (TIAZAC) 180 mg capsule     apixaban (ELIQUIS) 5 mg tablet Take 5 mg by mouth two (2) times a day.    ezetimibe (ZETIA) 10 mg tablet Take 10 mg by mouth daily. diphenhydrAMINE (BENADRYL ALLERGY) 25 mg tablet Take 25 mg by mouth every six (6) hours as needed for Sleep. No current facility-administered medications for this visit.      Medications Discontinued During This Encounter   Medication Reason rosuvastatin (CRESTOR) 5 mg tablet Not A Current Medication     ~~~~~~~~~~~~~~~~~~~~~~~~~~~~~~~~~~~~~~~~~~~~~~~~~~~~~~~~~~~    Chief Complaint   Patient presents with    Medication Evaluation     Follow up       History provided by patient  History of Present Illness   Chidi Hernandes is a 80 y.o. male who presents to clinic today for:  Medication Evaluation (Follow up)    Looking at replacing aortic valve. Still has to have carotid dopplers    Hypertension  At goal  Takes meds consistently    No longer on statin due to joint aches  Remains on Zetia  Still works 5 hours a week. Health Maintenance  Will do at future visit  Health Maintenance Due   Topic Date Due    DTaP/Tdap/Td series (1 - Tdap) Never done    Pneumococcal 65+ years (2 - PPSV23 if available, else PCV20) 07/19/2018    COVID-19 Vaccine (3 - Booster for Moderna series) 07/02/2021    Flu Vaccine (1) 08/01/2022     Review of Systems   Review of Systems   Respiratory:  Negative for shortness of breath. Cardiovascular:  Negative for chest pain. Gastrointestinal:  Negative for blood in stool. Genitourinary:  Negative for hematuria. Psychiatric/Behavioral: Negative. Vitals/Objective:     Vitals:    02/22/23 1514 02/22/23 1517   BP: (!) 143/69 138/67   Pulse: 64    Resp: 20    Temp: 97.4 °F (36.3 °C)    TempSrc: Temporal    SpO2: 98%    Weight: 204 lb 3.2 oz (92.6 kg)    Height: 6' (1.829 m)      Body mass index is 27.69 kg/m². Wt Readings from Last 3 Encounters:   02/22/23 204 lb 3.2 oz (92.6 kg)   07/06/22 210 lb (95.3 kg)   07/12/21 207 lb (93.9 kg)         Objective  Physical Exam  Vitals and nursing note reviewed. Constitutional:       Appearance: Normal appearance. He is not toxic-appearing. HENT:      Head: Normocephalic and atraumatic. Cardiovascular:      Rate and Rhythm: Normal rate and regular rhythm. Heart sounds: Murmur heard. Crescendo decrescendo systolic murmur is present with a grade of 3/6. No gallop. Pulmonary:      Effort: Pulmonary effort is normal. No respiratory distress. Breath sounds: Normal breath sounds. No wheezing, rhonchi or rales. Musculoskeletal:      Cervical back: No muscular tenderness. Right lower le+ Pitting Edema present. Left lower le+ Pitting Edema present. Lymphadenopathy:      Cervical: No cervical adenopathy. Neurological:      Mental Status: He is alert. Psychiatric:         Mood and Affect: Mood normal.         Behavior: Behavior normal.         Thought Content: Thought content normal.         Judgment: Judgment normal.         No results found for this or any previous visit (from the past 24 hour(s)). Disposition     Follow-up and Dispositions    Return in about 6 months (around 2023) for Blood pressure follow up, Medication follow up. No future appointments. History   Patient's past medical, surgical and family histories were reviewed and updated.     Past Medical History:   Diagnosis Date    Aortic stenosis     Moderate by ECHO 2019    CAD (coronary artery disease)     DVT (deep venous thrombosis) (HCC)     GERD (gastroesophageal reflux disease)     HTN (hypertension)     Hyperlipidemia     Mild anemia     Pulmonary embolism ([address]      Past Surgical History:   Procedure Laterality Date    HX CORONARY ARTERY BYPASS GRAFT      2007 by Dr. Melita Campuzano at [facility]     Family History   Problem Relation Age of Onset    Stroke Mother     Heart Attack Mother     Stroke Father     Heart Attack Father     Coronary Art Dis Brother     Deep Vein Thrombosis Brother     Deep Vein Thrombosis Brother     Coronary Art Dis Brother     Deep Vein Thrombosis Brother     Cancer Brother         Throat cancer    Coronary Art Dis Brother      Social History     Tobacco Use    Smoking status: Former    Smokeless tobacco: Never   Vaping Use    Vaping Use: Never used   Substance Use Topics    Alcohol use: Yes     Comment: OCASSIONALLY Drug use: No       Allergies     Allergies   Allergen Reactions    Lipitor [Atorvastatin] Myalgia In responding to this request, please exercise your independent professional judgment.  The fact that a question is asked does not imply that any particular answer is desired or expected.

## 2023-05-23 ENCOUNTER — OFFICE VISIT (OUTPATIENT)
Facility: CLINIC | Age: 83
End: 2023-05-23
Payer: MEDICARE

## 2023-05-23 VITALS
WEIGHT: 197.4 LBS | BODY MASS INDEX: 26.74 KG/M2 | SYSTOLIC BLOOD PRESSURE: 134 MMHG | OXYGEN SATURATION: 98 % | HEIGHT: 72 IN | DIASTOLIC BLOOD PRESSURE: 67 MMHG | TEMPERATURE: 97.7 F | HEART RATE: 65 BPM | RESPIRATION RATE: 20 BRPM

## 2023-05-23 DIAGNOSIS — I73.9 PVD (PERIPHERAL VASCULAR DISEASE) (HCC): ICD-10-CM

## 2023-05-23 DIAGNOSIS — Z95.2 HISTORY OF TRANSCATHETER AORTIC VALVE REPLACEMENT (TAVR): ICD-10-CM

## 2023-05-23 DIAGNOSIS — Z79.01 LONG TERM (CURRENT) USE OF ANTICOAGULANTS: ICD-10-CM

## 2023-05-23 DIAGNOSIS — Z09 HOSPITAL DISCHARGE FOLLOW-UP: Primary | ICD-10-CM

## 2023-05-23 DIAGNOSIS — I10 ESSENTIAL HYPERTENSION: ICD-10-CM

## 2023-05-23 DIAGNOSIS — E78.00 HYPERCHOLESTEREMIA: ICD-10-CM

## 2023-05-23 PROCEDURE — 1123F ACP DISCUSS/DSCN MKR DOCD: CPT | Performed by: FAMILY MEDICINE

## 2023-05-23 PROCEDURE — G8427 DOCREV CUR MEDS BY ELIG CLIN: HCPCS | Performed by: FAMILY MEDICINE

## 2023-05-23 PROCEDURE — 99214 OFFICE O/P EST MOD 30 MIN: CPT | Performed by: FAMILY MEDICINE

## 2023-05-23 PROCEDURE — 1036F TOBACCO NON-USER: CPT | Performed by: FAMILY MEDICINE

## 2023-05-23 PROCEDURE — 3075F SYST BP GE 130 - 139MM HG: CPT | Performed by: FAMILY MEDICINE

## 2023-05-23 PROCEDURE — G8419 CALC BMI OUT NRM PARAM NOF/U: HCPCS | Performed by: FAMILY MEDICINE

## 2023-05-23 PROCEDURE — 1111F DSCHRG MED/CURRENT MED MERGE: CPT | Performed by: FAMILY MEDICINE

## 2023-05-23 PROCEDURE — 3078F DIAST BP <80 MM HG: CPT | Performed by: FAMILY MEDICINE

## 2023-05-23 RX ORDER — AMLODIPINE BESYLATE 5 MG/1
TABLET ORAL DAILY
COMMUNITY
Start: 2018-12-14

## 2023-05-23 RX ORDER — OMEGA-3 FATTY ACIDS CAP DELAYED RELEASE 1000 MG 1000 MG
CAPSULE DELAYED RELEASE ORAL DAILY
COMMUNITY
End: 2023-05-23

## 2023-05-23 RX ORDER — ROSUVASTATIN CALCIUM 5 MG/1
TABLET, COATED ORAL
COMMUNITY
Start: 2023-03-30

## 2023-05-23 RX ORDER — DILTIAZEM HYDROCHLORIDE 180 MG/1
CAPSULE, EXTENDED RELEASE ORAL
COMMUNITY
Start: 2021-03-12

## 2023-05-23 RX ORDER — AMOXICILLIN 500 MG/1
TABLET, FILM COATED ORAL
COMMUNITY
Start: 2023-05-02 | End: 2023-05-23

## 2023-05-23 RX ORDER — METHYLPREDNISOLONE 4 MG/1
TABLET ORAL
COMMUNITY
Start: 2023-04-12 | End: 2023-05-23

## 2023-05-23 RX ORDER — APIXABAN 5 MG/1
TABLET, FILM COATED ORAL
COMMUNITY
Start: 2023-03-19

## 2023-05-23 RX ORDER — LISINOPRIL AND HYDROCHLOROTHIAZIDE 25; 20 MG/1; MG/1
TABLET ORAL
COMMUNITY
Start: 2023-04-19

## 2023-05-23 RX ORDER — RANITIDINE 150 MG/1
150 TABLET ORAL 2 TIMES DAILY
COMMUNITY
End: 2023-05-23

## 2023-05-23 RX ORDER — AMOXICILLIN AND CLAVULANATE POTASSIUM 875; 125 MG/1; MG/1
1 TABLET, FILM COATED ORAL EVERY 12 HOURS
COMMUNITY
Start: 2023-04-12 | End: 2023-05-23

## 2023-05-23 RX ORDER — EZETIMIBE 10 MG/1
10 TABLET ORAL DAILY
COMMUNITY

## 2023-05-23 SDOH — ECONOMIC STABILITY: INCOME INSECURITY: HOW HARD IS IT FOR YOU TO PAY FOR THE VERY BASICS LIKE FOOD, HOUSING, MEDICAL CARE, AND HEATING?: NOT HARD AT ALL

## 2023-05-23 SDOH — ECONOMIC STABILITY: FOOD INSECURITY: WITHIN THE PAST 12 MONTHS, YOU WORRIED THAT YOUR FOOD WOULD RUN OUT BEFORE YOU GOT MONEY TO BUY MORE.: NEVER TRUE

## 2023-05-23 SDOH — ECONOMIC STABILITY: HOUSING INSECURITY
IN THE LAST 12 MONTHS, WAS THERE A TIME WHEN YOU DID NOT HAVE A STEADY PLACE TO SLEEP OR SLEPT IN A SHELTER (INCLUDING NOW)?: NO

## 2023-05-23 SDOH — ECONOMIC STABILITY: FOOD INSECURITY: WITHIN THE PAST 12 MONTHS, THE FOOD YOU BOUGHT JUST DIDN'T LAST AND YOU DIDN'T HAVE MONEY TO GET MORE.: NEVER TRUE

## 2023-05-23 ASSESSMENT — ENCOUNTER SYMPTOMS
BLOOD IN STOOL: 0
SHORTNESS OF BREATH: 0
CHEST TIGHTNESS: 0

## 2023-05-23 NOTE — PROGRESS NOTES
dentified pt with two pt identifiers(name and ). Chief Complaint   Patient presents with    Follow-Up from [facility]   Aortic Stenosis follow up        Health Maintenance Due   Topic    DTaP/Tdap/Td vaccine (1 - Tdap)    Pneumococcal 65+ years Vaccine (2 - PPSV23 if available, else PCV20)    COVID-19 Vaccine (3 - Booster for Moderna series)       Wt Readings from Last 3 Encounters:   23 204 lb 3.2 oz (92.6 kg)   22 210 lb (95.3 kg)   21 207 lb (93.9 kg)     Temp Readings from Last 3 Encounters:   No data found for Temp     BP Readings from Last 3 Encounters:   23 138/67   22 (!) 152/70   21 136/69     Pulse Readings from Last 3 Encounters:   23 64   22 67   21 60           Coordination of Care Questionnaire:  :   1. \"Have you been to the ER, urgent care clinic since your last visit? Hospitalized since your last visit? \" Yes  [facility] surger  23    2. \"Have you seen or consulted any other health care providers outside of the [address] [place] [address] since your last visit? \" Yes   See above     3. For patients aged 39-70: Has the patient had a colonoscopy / FIT/ Cologuard? no      If the patient is female:    4. For patients aged 41-77: Has the patient had a mammogram within the past 2 years? no      5. For patients aged 21-65: Has the patient had a pap smear? no     3) Do you have an Advance Directive on file? no  Are you interested in receiving information about Advance Directives? no    Patient is accompanied by self I have received verbal consent from Sofiya Barrera to discuss any/all medical information while they are present in the room.

## 2023-05-23 NOTE — PROGRESS NOTES
Chastity Zimmerman  80 y.o. male  1940  XDV:[id number]  [facility]  Progress Note     Encounter Date: 5/23/2023    Assessment and Plan:       ICD-10-CM    1. Hospital discharge follow-up  Z09 NM DISCHARGE MEDS RECONCILED W/ CURRENT OUTPATIENT MED LIST      2. History of transcatheter aortic valve replacement (TAVR)  Z95.2       3. PVD (peripheral vascular disease) ([facility])  I73.9       4. Essential hypertension  I10       5. Hypercholesteremia  E78.00       6. Long term (current) use of anticoagulants  Z79.01         1. Hospital discharge follow-up  DC summary reviewed  Meds updated  FU with Cardiology is scheduled  -     NM DISCHARGE MEDS RECONCILED W/ CURRENT OUTPATIENT MED LIST  2. History of transcatheter aortic valve replacement (TAVR) 4/18/2023  Stable with good clinical results  3. PVD (peripheral vascular disease) ([facility])  Leg cramps with walking consistent with claudication  To ask Cardiology at upcoming appointment for evaluation for Neurogenic vs vascular claudication  4. Essential hypertension  At goal, continue meds  5. Hypercholesteremia  On crestor and Zetia  6. Long term (current) use of anticoagulants  No abnormal bleeding other than abrasion left upper arm       I have discussed the diagnosis with the patient and the intended plan as seen in the above orders. he has expressed understanding. The patient has received an after-visit summary and questions were answered concerning future plans. I have discussed medication side effects and warnings with the patient as well.     Electronically Signed: Carlos Gerardo MD    Current Medications after this visit     Current Outpatient Medications   Medication Sig    amLODIPine (NORVASC) 5 MG tablet daily    dilTIAZem (TIAZAC) 180 MG extended release capsule     ELIQUIS 5 MG TABS tablet     diphenhydrAMINE (SOMINEX) 25 MG tablet Take 1 tablet by mouth every 6 hours as needed    ezetimibe (ZETIA) 10 MG tablet Take 1 tablet by

## 2023-05-23 NOTE — PATIENT INSTRUCTIONS
Mediterranean diet: Choose this heart-healthy diet option  The Mediterranean diet is a heart-healthy eating plan combining elements of Mediterranean-style cooking. Here's how to adopt the Mediterranean diet. If you're looking for a heart-healthy eating plan, the Mediterranean diet might be right for you. The Mediterranean diet incorporates the basics of healthy eating - plus a splash of flavorful olive oil and perhaps a glass of red wine - among other components characterizing the traditional cooking style of countries bordering the [facility]. Most healthy diets include fruits, vegetables, fish and whole grains, and limit unhealthy fats. While these parts of a healthy diet remain tried-and-true, subtle variations or differences in proportions of certain foods may make a difference in your risk of heart disease. Benefits of the [address] has shown that the traditional Mediterranean diet reduces the risk of heart disease. In fact, a recent analysis of more than 1.5 million healthy adults demonstrated that following a Mediterranean diet was associated with a reduced risk of overall and cardiovascular mortality, a reduced incidence of cancer and cancer mortality, and a reduced incidence of Parkinson's and Alzheimer's diseases. For this reason, most if not all major scientific organizations encourage healthy adults to adapt a style of eating like that of the [address] for prevention of major chronic diseases. Key components of the Mediterranean diet  The Mediterranean diet emphasizes:   Getting plenty of exercise   Eating primarily plant-based foods, such as fruits and vegetables, whole grains, legumes and nuts   Replacing butter with healthy fats such as olive oil. Use Olive oil in salad dressing and on vegetables. Olive oil is rich in polyphenols which protect your heart.    Using herbs and spices instead of salt to flavor foods   Limiting red meat to no more

## 2023-07-20 ENCOUNTER — HOSPITAL ENCOUNTER (OUTPATIENT)
Facility: HOSPITAL | Age: 83
Discharge: HOME | End: 2023-07-20
Payer: COMMERCIAL

## 2023-07-20 VITALS
RESPIRATION RATE: 18 BRPM | DIASTOLIC BLOOD PRESSURE: 70 MMHG | HEART RATE: 64 BPM | TEMPERATURE: 97.8 F | SYSTOLIC BLOOD PRESSURE: 138 MMHG

## 2023-07-20 DIAGNOSIS — L97.523: Primary | ICD-10-CM

## 2023-07-20 PROCEDURE — 99213 OFFICE O/P EST LOW 20 MIN: CPT

## 2023-07-20 RX ORDER — DIPHENHYDRAMINE HCL 25 MG
25 TABLET ORAL NIGHTLY PRN
COMMUNITY

## 2023-07-20 RX ORDER — ACETAMINOPHEN 325 MG/1
650 TABLET ORAL EVERY 6 HOURS PRN
COMMUNITY

## 2023-07-20 NOTE — DISCHARGE INSTRUCTIONS
the hospital emergency room. PLEASE NOTE: IF YOU ARE UNABLE TO OBTAIN WOUND SUPPLIES, CONTINUE TO USE THE SUPPLIES YOU HAVE AVAILABLE UNTIL YOU ARE ABLE TO REACH US. IT IS MOST IMPORTANT TO KEEP THE WOUND COVERED AT ALL TIMES.

## 2023-07-20 NOTE — WOUND CARE
Strip [] Plain Packing Strip   [] Skin Sub:   [x] Other:  Betadine    [x] Cover and Secure with:     [x] Gauze [] Wil [] Kerlix   [] Foam  [] Super Absorbant [] ABD     [] Ace Wrap [] Other:    Limit contact of tape with skin. [x] Change dressing: [] Daily    [x] Every Other Day   [] Twice per week   [] Three times per week   [] Once a week [] Do Not Change Dressing   [] Other:     Edema Control:  Apply: [] Compression Stocking:  mmHg  []Right Leg []Left Leg   [] Tubigrip []Right Leg Double Layer []Left Leg Double Layer      []Right Leg Single Layer []Left Leg Single Layer      [] Elevate leg(s) above the level of the heart when sitting. [] Avoid prolonged standing in one place. Compression:  Apply: [] Compression Wrap to []RightLeg []Left Leg    []  Coflex [] Coflex Lite  [] Unna with Calamine Lite     [] Do not get leg(s) with wrap wet. If wrap becomes too tight, call the wound center and remove wrap from leg by unrolling each layer. [] Elevate leg(s) above the level of the heart when sitting. [] Avoid prolonged standing in one place. Dietary:  [] Diet as tolerated: [] Calorie Diabetic Diet: [x] No Added Salt:  [x] Increase Protein: [] Other:     Activity:  [x] Activity as tolerated:    [] Patient has no activity restrictions      [] Strict Bedrest   [] Remain off Work      [] May return to full duty work                                     [] Return to work with restrictions     Physician:  [] Dr. Myrtle Ruibn  [] Dr. Kathryn Mahoney  [x] Dr. Idalia Shaw  [] Dr. Rosey Alpers      Nurse Case Manger:  [facility] Information: Should you experience any significant changes in your wound(s) or have questions about your wound care, please contact the [facility] at [phone]. Our hours are Monday - Friday 8am - 4:30pm, closed all major holidays.  If you need help with your wound outside these hours and cannot wait until we are again available, contact your PCP or go to

## 2023-07-24 ENCOUNTER — TELEPHONE (OUTPATIENT)
Facility: CLINIC | Age: 83
End: 2023-07-24

## 2023-07-24 NOTE — TELEPHONE ENCOUNTER
Officer [place] with the [facility] number [phone] (Cell)    Pt was found in home on the floor this morning around 9:00 AM. No additional information was given. She wanting to know if Dr Sridhar Ray will sign the death certificate for the pt.

## 2023-07-26 ENCOUNTER — TELEPHONE (OUTPATIENT)
Facility: CLINIC | Age: 83
End: 2023-07-26

## 2023-07-27 PROBLEM — I73.9 PAD (PERIPHERAL ARTERY DISEASE) (HCC): Status: ACTIVE | Noted: 2023-07-27

## 2023-07-27 PROBLEM — L97.523: Status: ACTIVE | Noted: 2023-07-27

## 2023-07-27 ASSESSMENT — ENCOUNTER SYMPTOMS
DIARRHEA: 0
SHORTNESS OF BREATH: 0
ABDOMINAL PAIN: 0
VOMITING: 0

## 2023-08-01 ENCOUNTER — TELEPHONE (OUTPATIENT)
Facility: CLINIC | Age: 83
End: 2023-08-01

## 2023-08-01 NOTE — TELEPHONE ENCOUNTER
home called again stating they can't bury the pt without the electronic death certificate signed by Dr. Ambreen Maldonado.

## 2023-08-01 NOTE — TELEPHONE ENCOUNTER
[address] re death of father at home. She recounted that she found him face down on his bedroom floor when told he had not showed up for work. He had felt mildly bad over the weekend with cold symptoms and burning feet. Discussed with her that we can only speculate cause of death without autopsy so I think best guess is an arrhythmic death. Will fill out death certificate that way.   [facility]